# Patient Record
Sex: MALE | Race: WHITE | Employment: UNEMPLOYED | URBAN - METROPOLITAN AREA
[De-identification: names, ages, dates, MRNs, and addresses within clinical notes are randomized per-mention and may not be internally consistent; named-entity substitution may affect disease eponyms.]

---

## 2017-03-07 ENCOUNTER — OFFICE VISIT (OUTPATIENT)
Dept: PRIMARY CARE CLINIC | Age: 52
End: 2017-03-07

## 2017-03-07 VITALS
DIASTOLIC BLOOD PRESSURE: 95 MMHG | TEMPERATURE: 97.7 F | WEIGHT: 285 LBS | SYSTOLIC BLOOD PRESSURE: 145 MMHG | BODY MASS INDEX: 42.21 KG/M2 | OXYGEN SATURATION: 94 % | HEIGHT: 69 IN | HEART RATE: 87 BPM

## 2017-03-07 DIAGNOSIS — Z12.11 SCREEN FOR COLON CANCER: ICD-10-CM

## 2017-03-07 DIAGNOSIS — E78.49 OTHER HYPERLIPIDEMIA: ICD-10-CM

## 2017-03-07 DIAGNOSIS — R73.9 HYPERGLYCEMIA: ICD-10-CM

## 2017-03-07 DIAGNOSIS — R52 PAIN DISORDER: ICD-10-CM

## 2017-03-07 DIAGNOSIS — E55.9 VITAMIN D DEFICIENCY: ICD-10-CM

## 2017-03-07 DIAGNOSIS — F17.200 SMOKER: ICD-10-CM

## 2017-03-07 DIAGNOSIS — E66.01 MORBID OBESITY DUE TO EXCESS CALORIES (HCC): ICD-10-CM

## 2017-03-07 DIAGNOSIS — R60.0 LOCALIZED EDEMA: Primary | ICD-10-CM

## 2017-03-07 LAB
A/G RATIO: 1.7 (ref 1.1–2.2)
ALBUMIN SERPL-MCNC: 4.7 G/DL (ref 3.4–5)
ALP BLD-CCNC: 88 U/L (ref 40–129)
ALT SERPL-CCNC: 51 U/L (ref 10–40)
AMPHETAMINE SCREEN, URINE: NORMAL
ANION GAP SERPL CALCULATED.3IONS-SCNC: 15 MMOL/L (ref 3–16)
AST SERPL-CCNC: 28 U/L (ref 15–37)
BARBITURATE SCREEN URINE: NORMAL
BASOPHILS ABSOLUTE: 0 K/UL (ref 0–0.2)
BASOPHILS RELATIVE PERCENT: 0.2 %
BENZODIAZEPINE SCREEN, URINE: NORMAL
BILIRUB SERPL-MCNC: 0.3 MG/DL (ref 0–1)
BILIRUBIN URINE: NEGATIVE
BLOOD, URINE: ABNORMAL
BUN BLDV-MCNC: 14 MG/DL (ref 7–20)
CALCIUM SERPL-MCNC: 9.8 MG/DL (ref 8.3–10.6)
CANNABINOID SCREEN URINE: NORMAL
CHLORIDE BLD-SCNC: 100 MMOL/L (ref 99–110)
CHOLESTEROL, TOTAL: 175 MG/DL (ref 0–199)
CLARITY: CLEAR
CO2: 25 MMOL/L (ref 21–32)
COCAINE METABOLITE SCREEN URINE: NORMAL
COLOR: ABNORMAL
COMMENT UA: NORMAL
CREAT SERPL-MCNC: 0.8 MG/DL (ref 0.9–1.3)
EOSINOPHILS ABSOLUTE: 0.1 K/UL (ref 0–0.6)
EOSINOPHILS RELATIVE PERCENT: 1.2 %
EPITHELIAL CELLS, UA: 0 /HPF (ref 0–5)
GFR AFRICAN AMERICAN: >60
GFR NON-AFRICAN AMERICAN: >60
GLOBULIN: 2.8 G/DL
GLUCOSE BLD-MCNC: 90 MG/DL (ref 70–99)
GLUCOSE URINE: NEGATIVE MG/DL
HCT VFR BLD CALC: 47.9 % (ref 40.5–52.5)
HDLC SERPL-MCNC: 47 MG/DL (ref 40–60)
HEMOGLOBIN: 15.8 G/DL (ref 13.5–17.5)
HYALINE CASTS: 0 /HPF (ref 0–8)
KETONES, URINE: NEGATIVE MG/DL
LDL CHOLESTEROL CALCULATED: 100 MG/DL
LEUKOCYTE ESTERASE, URINE: NEGATIVE
LYMPHOCYTES ABSOLUTE: 3.1 K/UL (ref 1–5.1)
LYMPHOCYTES RELATIVE PERCENT: 32.2 %
Lab: NORMAL
MCH RBC QN AUTO: 31.7 PG (ref 26–34)
MCHC RBC AUTO-ENTMCNC: 32.9 G/DL (ref 31–36)
MCV RBC AUTO: 96.3 FL (ref 80–100)
METHADONE SCREEN, URINE: NORMAL
MICROSCOPIC EXAMINATION: YES
MONOCYTES ABSOLUTE: 0.9 K/UL (ref 0–1.3)
MONOCYTES RELATIVE PERCENT: 9.7 %
NEUTROPHILS ABSOLUTE: 5.4 K/UL (ref 1.7–7.7)
NEUTROPHILS RELATIVE PERCENT: 56.7 %
NITRITE, URINE: NEGATIVE
OPIATE SCREEN URINE: NORMAL
OXYCODONE URINE: NORMAL
PDW BLD-RTO: 13.5 % (ref 12.4–15.4)
PH UA: 6.5
PH UA: 6.5
PHENCYCLIDINE SCREEN URINE: NORMAL
PLATELET # BLD: 258 K/UL (ref 135–450)
PMV BLD AUTO: 8.3 FL (ref 5–10.5)
POTASSIUM SERPL-SCNC: 4.5 MMOL/L (ref 3.5–5.1)
PRO-BNP: 41 PG/ML (ref 0–124)
PROPOXYPHENE SCREEN: NORMAL
PROTEIN UA: NEGATIVE MG/DL
RBC # BLD: 4.97 M/UL (ref 4.2–5.9)
RBC UA: 2 /HPF (ref 0–4)
SODIUM BLD-SCNC: 140 MMOL/L (ref 136–145)
SPECIFIC GRAVITY UA: 1.03
TOTAL PROTEIN: 7.5 G/DL (ref 6.4–8.2)
TRIGL SERPL-MCNC: 138 MG/DL (ref 0–150)
TSH SERPL DL<=0.05 MIU/L-ACNC: 1.4 UIU/ML (ref 0.27–4.2)
URINE TYPE: ABNORMAL
UROBILINOGEN, URINE: 0.2 E.U./DL
VITAMIN D 25-HYDROXY: 20.8 NG/ML
VLDLC SERPL CALC-MCNC: 28 MG/DL
WBC # BLD: 9.6 K/UL (ref 4–11)
WBC UA: 1 /HPF (ref 0–5)

## 2017-03-07 PROCEDURE — 99204 OFFICE O/P NEW MOD 45 MIN: CPT | Performed by: INTERNAL MEDICINE

## 2017-03-07 ASSESSMENT — ENCOUNTER SYMPTOMS
DIARRHEA: 0
WHEEZING: 0
CHEST TIGHTNESS: 0
GASTROINTESTINAL NEGATIVE: 1
COUGH: 0
SHORTNESS OF BREATH: 1
BACK PAIN: 1
CONSTIPATION: 0
BLOOD IN STOOL: 0

## 2017-03-07 ASSESSMENT — PATIENT HEALTH QUESTIONNAIRE - PHQ9
SUM OF ALL RESPONSES TO PHQ QUESTIONS 1-9: 0
2. FEELING DOWN, DEPRESSED OR HOPELESS: 0
1. LITTLE INTEREST OR PLEASURE IN DOING THINGS: 0
SUM OF ALL RESPONSES TO PHQ9 QUESTIONS 1 & 2: 0

## 2017-03-08 LAB
ESTIMATED AVERAGE GLUCOSE: 128.4 MG/DL
HBA1C MFR BLD: 6.1 %

## 2017-03-08 RX ORDER — ERGOCALCIFEROL (VITAMIN D2) 1250 MCG
50000 CAPSULE ORAL WEEKLY
Qty: 4 CAPSULE | Refills: 3 | Status: SHIPPED | OUTPATIENT
Start: 2017-03-08 | End: 2018-10-31 | Stop reason: ALTCHOICE

## 2017-03-29 ENCOUNTER — OFFICE VISIT (OUTPATIENT)
Dept: PRIMARY CARE CLINIC | Age: 52
End: 2017-03-29

## 2017-03-29 VITALS
HEART RATE: 103 BPM | WEIGHT: 274 LBS | OXYGEN SATURATION: 96 % | SYSTOLIC BLOOD PRESSURE: 135 MMHG | BODY MASS INDEX: 40.46 KG/M2 | DIASTOLIC BLOOD PRESSURE: 85 MMHG

## 2017-03-29 DIAGNOSIS — R60.0 LOCALIZED EDEMA: Primary | ICD-10-CM

## 2017-03-29 DIAGNOSIS — F17.200 SMOKER: ICD-10-CM

## 2017-03-29 DIAGNOSIS — E55.9 VITAMIN D DEFICIENCY: ICD-10-CM

## 2017-03-29 DIAGNOSIS — R06.02 SOB (SHORTNESS OF BREATH): ICD-10-CM

## 2017-03-29 PROCEDURE — 99214 OFFICE O/P EST MOD 30 MIN: CPT | Performed by: INTERNAL MEDICINE

## 2017-03-29 RX ORDER — NICOTINE 21 MG/24HR
1 PATCH, TRANSDERMAL 24 HOURS TRANSDERMAL EVERY 24 HOURS
Qty: 30 PATCH | Refills: 3 | Status: SHIPPED | OUTPATIENT
Start: 2017-03-29 | End: 2018-10-17

## 2017-03-29 RX ORDER — FUROSEMIDE 20 MG/1
40 TABLET ORAL 2 TIMES DAILY
Qty: 60 TABLET | Refills: 3 | Status: SHIPPED | OUTPATIENT
Start: 2017-03-29 | End: 2018-08-08 | Stop reason: SDUPTHER

## 2017-03-29 RX ORDER — ERGOCALCIFEROL (VITAMIN D2) 1250 MCG
50000 CAPSULE ORAL WEEKLY
Qty: 4 CAPSULE | Refills: 3 | Status: SHIPPED | OUTPATIENT
Start: 2017-03-29 | End: 2017-05-09 | Stop reason: SDUPTHER

## 2017-03-29 ASSESSMENT — ENCOUNTER SYMPTOMS
DIARRHEA: 0
COUGH: 0
CONSTIPATION: 0
CHEST TIGHTNESS: 0
BACK PAIN: 1
GASTROINTESTINAL NEGATIVE: 1
WHEEZING: 0
BLOOD IN STOOL: 0
SHORTNESS OF BREATH: 1

## 2017-05-03 ENCOUNTER — HOSPITAL ENCOUNTER (OUTPATIENT)
Dept: NON INVASIVE DIAGNOSTICS | Age: 52
Discharge: HOME | End: 2017-05-04
Admitting: INTERNAL MEDICINE

## 2017-05-03 ENCOUNTER — HOSPITAL ENCOUNTER (OUTPATIENT)
Dept: PULMONOLOGY | Age: 52
Discharge: HOME | End: 2017-05-03
Attending: INTERNAL MEDICINE | Admitting: INTERNAL MEDICINE

## 2017-05-03 DIAGNOSIS — F17.200 SMOKER: ICD-10-CM

## 2017-05-03 DIAGNOSIS — R06.02 SOB (SHORTNESS OF BREATH): ICD-10-CM

## 2017-05-03 DIAGNOSIS — R06.02 SHORTNESS OF BREATH: ICD-10-CM

## 2017-05-03 LAB
DLCO: NORMAL ML/MMHG SEC
FEV1/FVC: NORMAL %
FEV1: NORMAL LITERS
FVC: NORMAL LITERS
LV EF: 60 %
LVEF MODALITY: NORMAL
TLC: NORMAL LITERS

## 2017-05-03 PROCEDURE — 94060 EVALUATION OF WHEEZING: CPT | Performed by: INTERNAL MEDICINE

## 2017-05-03 PROCEDURE — 94727 GAS DIL/WSHOT DETER LNG VOL: CPT | Performed by: INTERNAL MEDICINE

## 2017-05-03 PROCEDURE — 94729 DIFFUSING CAPACITY: CPT | Performed by: INTERNAL MEDICINE

## 2017-05-03 RX ORDER — ALBUTEROL SULFATE 90 UG/1
4 AEROSOL, METERED RESPIRATORY (INHALATION) ONCE
Status: COMPLETED | OUTPATIENT
Start: 2017-05-03 | End: 2017-05-03

## 2017-05-03 RX ADMIN — ALBUTEROL SULFATE 4 PUFF: 90 AEROSOL, METERED RESPIRATORY (INHALATION) at 14:15

## 2017-05-09 ENCOUNTER — OFFICE VISIT (OUTPATIENT)
Dept: CARDIOLOGY CLINIC | Age: 52
End: 2017-05-09

## 2017-05-09 VITALS
BODY MASS INDEX: 40.14 KG/M2 | OXYGEN SATURATION: 96 % | HEART RATE: 98 BPM | SYSTOLIC BLOOD PRESSURE: 128 MMHG | HEIGHT: 69 IN | DIASTOLIC BLOOD PRESSURE: 74 MMHG | WEIGHT: 271 LBS

## 2017-05-09 DIAGNOSIS — R60.0 LOCALIZED EDEMA: ICD-10-CM

## 2017-05-09 DIAGNOSIS — R06.09 DOE (DYSPNEA ON EXERTION): Primary | ICD-10-CM

## 2017-05-09 DIAGNOSIS — J43.9 PULMONARY EMPHYSEMA, UNSPECIFIED EMPHYSEMA TYPE (HCC): ICD-10-CM

## 2017-05-09 DIAGNOSIS — F17.218 CIGARETTE NICOTINE DEPENDENCE WITH OTHER NICOTINE-INDUCED DISORDER: ICD-10-CM

## 2017-05-09 PROCEDURE — 99204 OFFICE O/P NEW MOD 45 MIN: CPT | Performed by: INTERNAL MEDICINE

## 2017-05-09 PROCEDURE — 93000 ELECTROCARDIOGRAM COMPLETE: CPT | Performed by: INTERNAL MEDICINE

## 2018-08-08 ENCOUNTER — OFFICE VISIT (OUTPATIENT)
Dept: INTERNAL MEDICINE CLINIC | Age: 53
End: 2018-08-08

## 2018-08-08 VITALS
WEIGHT: 314 LBS | HEART RATE: 99 BPM | DIASTOLIC BLOOD PRESSURE: 85 MMHG | BODY MASS INDEX: 46.51 KG/M2 | RESPIRATION RATE: 16 BRPM | SYSTOLIC BLOOD PRESSURE: 138 MMHG | HEIGHT: 69 IN | OXYGEN SATURATION: 96 %

## 2018-08-08 DIAGNOSIS — Z76.89 ENCOUNTER TO ESTABLISH CARE: ICD-10-CM

## 2018-08-08 DIAGNOSIS — L03.115 CELLULITIS OF RIGHT LOWER LEG: Primary | ICD-10-CM

## 2018-08-08 DIAGNOSIS — R60.0 LOCALIZED EDEMA: ICD-10-CM

## 2018-08-08 PROCEDURE — 4004F PT TOBACCO SCREEN RCVD TLK: CPT | Performed by: NURSE PRACTITIONER

## 2018-08-08 PROCEDURE — 99204 OFFICE O/P NEW MOD 45 MIN: CPT | Performed by: NURSE PRACTITIONER

## 2018-08-08 PROCEDURE — G8427 DOCREV CUR MEDS BY ELIG CLIN: HCPCS | Performed by: NURSE PRACTITIONER

## 2018-08-08 PROCEDURE — G8417 CALC BMI ABV UP PARAM F/U: HCPCS | Performed by: NURSE PRACTITIONER

## 2018-08-08 PROCEDURE — 3017F COLORECTAL CA SCREEN DOC REV: CPT | Performed by: NURSE PRACTITIONER

## 2018-08-08 RX ORDER — FUROSEMIDE 40 MG/1
40 TABLET ORAL 2 TIMES DAILY
Qty: 60 TABLET | Refills: 1 | Status: SHIPPED | OUTPATIENT
Start: 2018-08-08 | End: 2018-11-07 | Stop reason: SDUPTHER

## 2018-08-08 RX ORDER — SULFAMETHOXAZOLE AND TRIMETHOPRIM 800; 160 MG/1; MG/1
1 TABLET ORAL 2 TIMES DAILY
Qty: 14 TABLET | Refills: 0 | Status: SHIPPED | OUTPATIENT
Start: 2018-08-08 | End: 2018-08-15

## 2018-08-08 ASSESSMENT — PATIENT HEALTH QUESTIONNAIRE - PHQ9
2. FEELING DOWN, DEPRESSED OR HOPELESS: 0
SUM OF ALL RESPONSES TO PHQ QUESTIONS 1-9: 0
SUM OF ALL RESPONSES TO PHQ QUESTIONS 1-9: 0
SUM OF ALL RESPONSES TO PHQ9 QUESTIONS 1 & 2: 0
1. LITTLE INTEREST OR PLEASURE IN DOING THINGS: 0

## 2018-08-08 NOTE — PATIENT INSTRUCTIONS
scrapes, or other injuries to your skin. Cellulitis most often occurs where there is a break in the skin. · If you get a scrape, cut, mild burn, or bite, wash the wound with clean water as soon as you can to help avoid infection. Don't use hydrogen peroxide or alcohol, which can slow healing. · If you have swelling in your legs (edema), support stockings and good skin care may help prevent leg sores and cellulitis. · Take care of your feet, especially if you have diabetes or other conditions that increase the risk of infection. Wear shoes and socks. Do not go barefoot. If you have athlete's foot or other skin problems on your feet, talk to your doctor about how to treat them. When should you call for help? Call your doctor now or seek immediate medical care if:    · You have signs that your infection is getting worse, such as:  ¨ Increased pain, swelling, warmth, or redness. ¨ Red streaks leading from the area. ¨ Pus draining from the area. ¨ A fever.     · You get a rash.    Watch closely for changes in your health, and be sure to contact your doctor if:    · You do not get better as expected. Where can you learn more? Go to https://[email or web address]. org and sign in to your [employer] account. Enter F353 in the [facility] box to learn more about \"Cellulitis: Care Instructions. \"     If you do not have an account, please click on the \"Sign Up Now\" link. Current as of: May 10, 2017  Content Version: 11.7  © 6768-9564 [employer]. Care instructions adapted under license by [facility] ([facility]). If you have questions about a medical condition or this instruction, always ask your healthcare professional. [place] [address] any warranty or liability for your use of this information.

## 2018-08-08 NOTE — PROGRESS NOTES
8/14/18    Chief Complaint   Patient presents with   [facility] ED Follow-up     had infection leg-still draining    Establish Care     does not have a PCP       HPI:   Donna Collier is a 47 y.o. male is here today for follow from ED and to establish care with new PCP. Patient was seen in ED on 7/25/18 for open wound on right leg. Recommended for patient to be admitted for IV antibiotics but patient refused so was placed on Clindamycin 3 times per day, completed on Saturday, however no improvement. Patient also has severe lower leg edema bilaterally, reports having  problems with lower leg edema for years but nothing ever done. Patient reports feet and legs are so swollen, sometimes cannot get on his shoes. Patient Active Problem List   Diagnosis    Bilateral lower extremity edema    Shortness of breath    [facility]ch[facility] disease) ([facility])    Leg ulcer, right, limited to breakdown of skin ([address]    Leg swelling    Lymphedema of both lower extremities    Tobacco abuse       Past Medical History:   Diagnosis Date    COPD (chronic obstructive pulmonary disease) ([facility]        Family History   Problem Relation Age of Onset    Other Mother         liver from drinking    Heart Disease Father        No Known Allergies    Current Outpatient Prescriptions   Medication Sig Dispense Refill    furosemide (LASIX) 40 MG tablet Take 1 tablet by mouth 2 times daily 60 tablet 1    sulfamethoxazole-trimethoprim (BACTRIM DS) 800-160 MG per tablet Take 1 tablet by mouth 2 times daily for 7 days 14 tablet 0    ciprofloxacin (CIPRO) 500 MG tablet Take 1 tablet by mouth 2 times daily for 10 days 20 tablet 0    collagenase (SANTYL) 250 UNIT/GM ointment Apply topically to wound bed (nickel-thickness). Cover with gauze moistened in normal saline, followed by dry gauze dressing and an ACE wrap.   Wound measures 7.8 cm x 27 cm x 0.1 cm 30 g 1    nicotine (NICODERM CQ) 21 MG/24HR Place 1 patch onto the skin every 24

## 2018-08-10 ENCOUNTER — HOSPITAL ENCOUNTER (OUTPATIENT)
Dept: WOUND CARE | Age: 53
Discharge: HOME | End: 2018-08-10
Attending: NURSE PRACTITIONER | Admitting: NURSE PRACTITIONER

## 2018-08-10 VITALS
TEMPERATURE: 98.3 F | HEART RATE: 105 BPM | WEIGHT: 315 LBS | BODY MASS INDEX: 46.65 KG/M2 | HEIGHT: 69 IN | DIASTOLIC BLOOD PRESSURE: 91 MMHG | SYSTOLIC BLOOD PRESSURE: 150 MMHG | RESPIRATION RATE: 16 BRPM

## 2018-08-10 DIAGNOSIS — L97.911 LEG ULCER, RIGHT, LIMITED TO BREAKDOWN OF SKIN (HCC): ICD-10-CM

## 2018-08-10 DIAGNOSIS — M79.89 LEG SWELLING: ICD-10-CM

## 2018-08-10 DIAGNOSIS — I89.0 LYMPHEDEMA OF BOTH LOWER EXTREMITIES: ICD-10-CM

## 2018-08-10 PROCEDURE — 97598 DBRDMT OPN WND ADDL 20CM/<: CPT | Performed by: NURSE PRACTITIONER

## 2018-08-10 PROCEDURE — 97597 DBRDMT OPN WND 1ST 20 CM/<: CPT | Performed by: NURSE PRACTITIONER

## 2018-08-10 PROCEDURE — 99213 OFFICE O/P EST LOW 20 MIN: CPT | Performed by: NURSE PRACTITIONER

## 2018-08-10 RX ORDER — LIDOCAINE HYDROCHLORIDE 40 MG/ML
SOLUTION TOPICAL PRN
Status: DISCONTINUED | OUTPATIENT
Start: 2018-08-10 | End: 2018-08-11 | Stop reason: HOSPADM

## 2018-08-10 ASSESSMENT — PAIN SCALES - GENERAL: PAINLEVEL_OUTOF10: 0

## 2018-08-10 NOTE — PLAN OF CARE
Problem: Wound:  Goal: Will show signs of wound healing; wound closure and no evidence of infection  Will show signs of wound healing; wound closure and no evidence of infection  Outcome: Ongoing      Problem: Venous:  Goal: Signs of wound healing will improve  Signs of wound healing will improve  Outcome: Ongoing      Problem: Smoking cessation:  Goal: Ability to formulate a plan to maintain a tobacco-free life will be supported  Ability to formulate a plan to maintain a tobacco-free life will be supported  Outcome: Ongoing      Problem: Compression therapy:  Goal: Will be free from complications associated with compression therapy  Will be free from complications associated with compression therapy  Outcome: Ongoing      Problem: Weight control:  Goal: Ability to maintain an optimal weight for height and age will be supported  Ability to maintain an optimal weight for height and age will be supported  Outcome: Ongoing      Problem: Falls - Risk of:  Goal: Will remain free from falls  Will remain free from falls  Outcome: Ongoing

## 2018-08-12 PROBLEM — I89.0 LYMPHEDEMA OF BOTH LOWER EXTREMITIES: Status: ACTIVE | Noted: 2018-08-12

## 2018-08-13 LAB
GRAM STAIN RESULT: ABNORMAL
ORGANISM: ABNORMAL
ORGANISM: ABNORMAL
WOUND/ABSCESS: ABNORMAL

## 2018-08-13 RX ORDER — CIPROFLOXACIN 500 MG/1
500 TABLET, FILM COATED ORAL 2 TIMES DAILY
Qty: 20 TABLET | Refills: 0 | Status: SHIPPED | OUTPATIENT
Start: 2018-08-13 | End: 2018-08-23

## 2018-08-14 PROBLEM — Z72.0 TOBACCO ABUSE: Status: ACTIVE | Noted: 2018-08-14

## 2018-08-17 ENCOUNTER — HOSPITAL ENCOUNTER (OUTPATIENT)
Dept: WOUND CARE | Age: 53
Discharge: HOME | End: 2018-08-17
Attending: NURSE PRACTITIONER | Admitting: NURSE PRACTITIONER

## 2018-08-17 VITALS
RESPIRATION RATE: 18 BRPM | DIASTOLIC BLOOD PRESSURE: 89 MMHG | HEART RATE: 110 BPM | TEMPERATURE: 98.2 F | SYSTOLIC BLOOD PRESSURE: 174 MMHG

## 2018-08-17 DIAGNOSIS — I89.0 LYMPHEDEMA OF BOTH LOWER EXTREMITIES: ICD-10-CM

## 2018-08-17 DIAGNOSIS — R60.0 BILATERAL LOWER EXTREMITY EDEMA: ICD-10-CM

## 2018-08-17 DIAGNOSIS — L97.911 LEG ULCER, RIGHT, LIMITED TO BREAKDOWN OF SKIN (HCC): Primary | ICD-10-CM

## 2018-08-17 PROCEDURE — 97598 DBRDMT OPN WND ADDL 20CM/<: CPT | Performed by: NURSE PRACTITIONER

## 2018-08-17 PROCEDURE — 97597 DBRDMT OPN WND 1ST 20 CM/<: CPT | Performed by: NURSE PRACTITIONER

## 2018-08-17 RX ORDER — LIDOCAINE HYDROCHLORIDE 40 MG/ML
SOLUTION TOPICAL PRN
Status: DISCONTINUED | OUTPATIENT
Start: 2018-08-17 | End: 2018-08-18 | Stop reason: HOSPADM

## 2018-08-17 NOTE — PROGRESS NOTES
for 10 days 20 tablet 0    collagenase (SANTYL) 250 UNIT/GM ointment Apply topically to wound bed (nickel-thickness). Cover with gauze moistened in normal saline, followed by dry gauze dressing and an ACE wrap. Wound measures 7.8 cm x 27 cm x 0.1 cm 30 g 1    furosemide (LASIX) 40 MG tablet Take 1 tablet by mouth 2 times daily 60 tablet 1    nicotine (NICODERM CQ) 21 MG/24HR Place 1 patch onto the skin every 24 hours 30 patch 3    ergocalciferol (DRISDOL) 74278 UNITS capsule Take 1 capsule by mouth once a week 4 capsule 3     No current facility-administered medications on file prior to encounter. REVIEW OF SYSTEMS    Pertinent items are noted in HPI.     Objective:      BP (!) 174/89   Pulse 110   Temp 98.2 °F (36.8 °C) (Oral)   Resp 18     Wt Readings from Last 3 Encounters:   08/10/18 (!) 321 lb 3.4 oz (145.7 kg)   08/08/18 (!) 314 lb (142.4 kg)   07/25/18 (!) 317 lb 7.4 oz (144 kg)       PHYSICAL EXAM    General Appearance: alert and oriented to person, place and time, well developed and well- nourished, in no acute distress  Skin: warm and dry; right lower leg with venous ulcer  Head: normocephalic and atraumatic  Neck: supple and non-tender without mass  Pulmonary/Chest: clear to auscultation bilaterally- no wheezes, rales or rhonchi, normal air movement, no respiratory distress  Cardiovascular: distal pulses intact per doppler (patient's foot is too swollen to find palpable pulses today)  Extremities: no cyanosis or clubbing; +4 pitting edema; patient has lymphedema in bilateral LE's  Musculoskeletal: normal range of motion, no joint swelling, deformity or tenderness  Neurologic: reflexes normal and symmetric, no cranial nerve deficit, gait, coordination and speech normal      Assessment:      Patient Active Problem List   Diagnosis Code    Bilateral lower extremity edema R60.0    Shortness of breath R06.02    COPD (chronic obstructive pulmonary disease) [facility] J44.9    Leg ulcer, right, limited to breakdown of skin ([facility] L97.911    Leg swelling M79.89    Lymphedema of both lower extremities I89.0    Tobacco abuse Z72.0          Non-Excisional Debridement Procedure Note    Performed by: MARY Rodriguez CNP    Consent obtained:  Yes    Time out taken:  Yes     Pain Control: Anesthetic: 4% Lidocaine Liquid Topical (10 ml)     Debridement: Non-excisional Debridement    Devitalized Tissue Debrided: slough      Instruments Used:  curette     Pre Debridement Measurements:  Are located in the [place]  Documentation Flow Sheet  Wound/Ulcer #: 1     Post  Debridement Measurements:  Wound 08/10/18 Leg Right; Anterior; Lower; Lateral;Posterior #1 (wound appeared approximately 7/1/2018) (Active)   Wound Image    8/10/2018 11:24 AM   Wound Type Wound 8/17/2018 10:16 AM   Wound Venous 8/17/2018 10:16 AM   Dressing Status Intact; Old drainage 8/17/2018 10:16 AM   Dressing Changed Changed/New 8/17/2018 11:48 AM   Dressing/Treatment Ace Wrap;Alginate;Dry dressing 8/17/2018 11:48 AM   Wound Cleansed Rinsed/Irrigated with saline 8/10/2018 11:24 AM   Wound Length (cm) 7.3 cm 8/17/2018 10:33 AM   Wound Width (cm) 25.1 cm 8/17/2018 10:33 AM   Wound Depth (cm)  0.1 8/17/2018 10:33 AM   Calculated Wound Size (cm^2) (l*w) 183.23 cm^2 8/17/2018 10:33 AM   Change in Wound Size % (l*w) 13 8/17/2018 10:33 AM   Wound Assessment Granulation tissue;Slough 8/17/2018 10:16 AM   Drainage Amount Moderate 8/17/2018 10:16 AM   Drainage Description Green 8/17/2018 10:16 AM   Odor None 8/17/2018 10:16 AM   Margins Attached edges 8/17/2018 10:16 AM   Azucena-wound Assessment Dry;Pink 8/17/2018 10:16 AM   Non-staged Wound Description Full thickness 8/17/2018 10:16 AM   [place]%Wound Bed 10 8/17/2018 10:16 AM   Yellow%Wound Bed 90 8/17/2018 10:16 AM   Op First Treatment Date 08/10/18 8/17/2018 10:16 AM   Number of days: 7       Percent of Wound/Ulcer Debrided:  100%    Total Surface Area Debrided:  183.23 sq cm    Diabetic/Pressure/Non Pressure Ulcers only:  Ulcer: Non-Pressure ulcer, limited to breakdown of skin    Bleeding:  Minimal    Hemostasis Achieved:  by pressure    Procedural Pain:  8  / 10     Post Procedural Pain:  2 / 10     Response to treatment:  With complaints of pain. PATIENT EDUCATION focused on proper application of ACE wrap. Patient instructed to start at the ankle, go down to just above his toes and go up leg to just below knee. Avoid Velcro touching the skin. Patient verbalized understanding. He was instructed to wrap both legs. Plan:     Treatment Note please see attached Discharge Instructions    In my professional opinion this patient would benefit from HBO Therapy: No    Written patient dismissal instructions given to patient and signed by patient or POA. Discharge Instructions         Discharge Instructions        Wound Clinic Physician Orders and Discharge Instructions  [place] [address] [facility] [address], [place] [address]  Telephone: [phone]     NAME:  Francesca Beaver OF BIRTH:  1965  MEDICAL RECORD NUMBER:  [id number]  DATE:  8/17/2018     Wound Cleansing:   Do not scrub or use excessive force. Cleanse wound prior to applying a clean dressing with:  [] Normal Saline            [x] Keep Wound Dry in Shower    [] Wound Cleanser   [] Cleanse wound with Mild Soap & Water  [] May Shower at Discharge   [] Other:        Topical Treatments:  Do not apply lotions, creams, or ointments to wound bed unless directed. [] Apply moisturizing lotion to skin surrounding the wound prior to dressing change.  [] Apply antifungal ointment to skin surrounding the wound prior to dressing change.  [] Apply thin film of moisture barrier ointment to skin immediately around wound.   [] Other:                  Dressings:                  Wound Location: RIGHT LOWER LEG WOUNDS              [x] Apply Primary Dressing: ____________     [] Patient unable to sign Discharge Instructions given to ECF/Transportation/POA        [x]  After Visit Summary  []  Comprehensive Discharge Instruction             Electronically signed by MARY Gill CNP on 8/17/2018 at 12:39 PM

## 2018-08-24 ENCOUNTER — HOSPITAL ENCOUNTER (OUTPATIENT)
Dept: WOUND CARE | Age: 53
Discharge: HOME | End: 2018-08-24
Attending: NURSE PRACTITIONER | Admitting: NURSE PRACTITIONER

## 2018-08-24 VITALS
RESPIRATION RATE: 20 BRPM | HEART RATE: 112 BPM | TEMPERATURE: 97.8 F | SYSTOLIC BLOOD PRESSURE: 174 MMHG | DIASTOLIC BLOOD PRESSURE: 94 MMHG

## 2018-08-24 DIAGNOSIS — I89.0 LYMPHEDEMA OF BOTH LOWER EXTREMITIES: ICD-10-CM

## 2018-08-24 DIAGNOSIS — M79.89 LEG SWELLING: ICD-10-CM

## 2018-08-24 DIAGNOSIS — L97.911 LEG ULCER, RIGHT, LIMITED TO BREAKDOWN OF SKIN (HCC): Primary | ICD-10-CM

## 2018-08-24 PROCEDURE — 97597 DBRDMT OPN WND 1ST 20 CM/<: CPT | Performed by: NURSE PRACTITIONER

## 2018-08-24 PROCEDURE — 97598 DBRDMT OPN WND ADDL 20CM/<: CPT | Performed by: NURSE PRACTITIONER

## 2018-08-24 RX ORDER — CIPROFLOXACIN 500 MG/1
500 TABLET, FILM COATED ORAL 2 TIMES DAILY
COMMUNITY
Start: 2018-08-13 | End: 2018-08-25

## 2018-08-24 RX ORDER — LIDOCAINE HYDROCHLORIDE 40 MG/ML
SOLUTION TOPICAL PRN
Status: DISCONTINUED | OUTPATIENT
Start: 2018-08-24 | End: 2018-08-25 | Stop reason: HOSPADM

## 2018-08-24 NOTE — PROGRESS NOTES
[facility]   Progress Note and Procedure Note      Adam Chand  MEDICAL RECORD NUMBER:  [id number]  AGE: 47 y.o. GENDER: male  : 1965  EPISODE DATE:  2018    Subjective:     Chief Complaint   Patient presents with    Wound Check     follow up for wounds on right lower leg         HISTORY of PRESENT ILLNESS HPI    Hamlet Mcgrath is a 47 y. o. male who presents today for wound/ulcer evaluation. History of Wound Context: venous.  The patient presented to the ED on 18 for RLE cellulitis.  At that time, hospitalization was recommended, but the patient refused. Jose Alejandro Sales was treated with IV clindamycin in the ED and sent home with PO clindamycin. His wound culture was + for pseudomonas and we treated him with Cipro.   Wound/Ulcer Pain Timing/Severity: none  Quality of pain: N/A  Severity:  0 / 10   Modifying Factors: Pain worsens with local pressure and an increase in the amount of swelling in his LE  Associated Signs/Symptoms: edema, drainage     Ulcer Identification:  Ulcer Type: venous  Contributing Factors: edema, venous stasis, decreased mobility and obesity     Wound: N/A        PAST MEDICAL HISTORY        Diagnosis Date    COPD (chronic obstructive pulmonary disease) (H[facility])        PAST SURGICAL HISTORY    Past Surgical History:   Procedure Laterality Date    EYE SURGERY Left     weak eye muscle       FAMILY HISTORY    Family History   Problem Relation Age of Onset    Other Mother         liver from drinking    Heart Disease Father        SOCIAL HISTORY    Social History   Substance Use Topics    Smoking status: Current Every Day Smoker     Packs/day: 1.00     Types: Cigarettes    Smokeless tobacco: Current User    Alcohol use 7.2 oz/week     12 Cans of beer per week      Comment: quit liquor about 6 months ago       ALLERGIES    No Known Allergies    MEDICATIONS    Current Outpatient Prescriptions on File Prior to Encounter   Medication Sig Dispense Refill    Wrap                  Avoid contact of tape with skin.     [x] Change dressing:       [x] Three Times A Week        Edema Control: ACE WRAPS TO BILATERAL LOWER LEGS  Apply:  [] Compression Stocking           []Right Leg         []Left Leg              [] Tubigrip          []Right Leg Double Layer          []Left Leg Double Layer                                                  []Right Leg Single Layer           []Left Leg Single Layer              [] SpandaGrip    []Right Leg         []Left Leg                                      []Low compression 5-10 mm/Hg                                                 []Medium compression 10-20 mm/Hg                                      []High compression  20-30 mm/Hg              every morning immediately when getting up should be applied to affected leg(s) from mid foot to knee making sure to cover the heel.  Remove every night before going to bed.              [] Elevate leg(s) above the level of the heart when sitting.                    [] Avoid prolonged standing in one place.              [] Elevate arm/hand above the level of the heart         []RightArm         []LeftArm                Compression:  Apply:  [] Multilayer Compression Wrap Applied in Clinic        []RightLeg          []Left Leg              [] Multi-layer compression.  Do not get leg(s) with wrap wet.  If wraps become too tight call the center or completely remove the wrap.                 [] Elevate leg(s) above the level of the heart when sitting.                    [] Avoid prolonged standing in one place.     Dietary:  [x] Diet as tolerated:        [] Calorie Diabetic Diet: [] No Added Salt:  [] Increase Protein:        [] Other:      Activity:  [x] Activity as tolerated:  [] Patient has no activity restrictions     [] Strict Bedrest:            [] Remain off Work:     [] May return to full duty work:                [] NGPNLB to work with restrictions:          Return Appointment:  [] Wound and dressing supply provider:   [x] ECF or Home Healthcare: [facility]  [] Nurse visit: Vanda Simon or NP scheduled for Nurse Visit:   [x] Return Appointment: With Hesham Velez  in  1 Week(s)  [] Ordered tests:      Nurse Case Manger:  Celia  Electronically signed by Rubén Krishnan RN on 8/24/2018 at 9:34 AM        [address] [place] [address] Information: Should you experience any significant changes in your wound(s) or have questions about your wound care, please contact the [address] [place] [address] [phone] [facility] - THURSDAY 8:30 am - 4:30 pm and Friday 8:30 am - 1:00 pm.  If you need help with your wound outside these hours and cannot wait until we are again available, contact your PCP or go to the hospital emergency room.      Nurse Signature:_______________________     Date: ___________ Time:  ____________        Discharge Nurse Signature        PLEASE NOTE: IF YOU ARE UNABLE TO OBTAIN WOUND SUPPLIES, CONTINUE TO USE THE SUPPLIES YOU HAVE AVAILABLE UNTIL YOU ARE ABLE TO [facility].  IT IS MOST IMPORTANT TO KEEP THE WOUND COVERED AT ALL TIMES.     Physician Signature:_______________________     Date: ___________ Time:  ____________                    [] Dr Alysa Brown    [] Dr Jessica Skelton CNP              [] Dr Rigoberto Mcburney  [] Dr Krystle Pavon   [] Dr Quinton العراقي             [] Dr Marlena Rich                [] Dr Luke Espinosa   [x] Rodger Nascimento NP            The information contained in the After Visit Summary has been reviewed with me, the patient and/or responsible adult, by my health care provider(s).  I had the opportunity to ask questions regarding this information.  I have elected to receive;        Patient Signature:_______________________     Date: ___________ Time:  ____________     [] Patient unable to sign Discharge Instructions given to ECF/Transportation/POA        [x]  After Visit Summary  []  Comprehensive Discharge Instruction             Electronically signed by MARY Mitchell CNP on 8/24/2018 at 1:19 PM

## 2018-08-29 ENCOUNTER — HOSPITAL ENCOUNTER (OUTPATIENT)
Dept: WOUND CARE | Age: 53
Discharge: HOME | End: 2018-08-29
Admitting: NURSE PRACTITIONER

## 2018-08-29 VITALS
DIASTOLIC BLOOD PRESSURE: 96 MMHG | RESPIRATION RATE: 18 BRPM | SYSTOLIC BLOOD PRESSURE: 146 MMHG | HEART RATE: 100 BPM | TEMPERATURE: 98.7 F

## 2018-08-29 DIAGNOSIS — E66.01 MORBID OBESITY (HCC): ICD-10-CM

## 2018-08-29 DIAGNOSIS — I87.2 VENOUS (PERIPHERAL) INSUFFICIENCY: ICD-10-CM

## 2018-08-29 RX ORDER — LIDOCAINE HYDROCHLORIDE 40 MG/ML
SOLUTION TOPICAL PRN
Status: DISCONTINUED | OUTPATIENT
Start: 2018-08-29 | End: 2018-08-30 | Stop reason: HOSPADM

## 2018-08-30 PROBLEM — I87.2 VENOUS (PERIPHERAL) INSUFFICIENCY: Status: ACTIVE | Noted: 2018-08-30

## 2018-08-30 PROBLEM — E66.01 MORBID OBESITY (HCC): Status: ACTIVE | Noted: 2018-08-30

## 2018-08-30 NOTE — PROGRESS NOTES
Mary Carmen Steele 37   Progress Note and Procedure Note      Lily Aguilar  MEDICAL RECORD NUMBER:  [id number]  AGE: 47 y.o. GENDER: male  : 1965  EPISODE DATE:  2018    Subjective:     Chief Complaint   Patient presents with    Wound Check     wound right lower leg         HISTORY of PRESENT ILLNESS HPI    Hamlet Mcgrath is a 47 y. o. male who presents today for wound/ulcer evaluation. History of Wound Context: venous.  The patient presented to the ED on 18 for RLE cellulitis.  At that time, hospitalization was recommended, but the patient refused. Lito Tejeda was treated with IV clindamycin in the ED and sent home with PO clindamycin. His wound culture was + for pseudomonas and he was treated  with Cipro.   Wound/Ulcer Pain Timing/Severity: none  Quality of pain: N/A  Severity:  0 / 10   Modifying Factors: Pain worsens with local pressure and an increase in the amount of swelling in his LE  Associated Signs/Symptoms: edema, drainage     Ulcer Identification:  Ulcer Type: venous  Contributing Factors: edema, venous stasis, decreased mobility and obesity     Wound: N/A            PAST MEDICAL HISTORY        Diagnosis Date    COPD (chronic obstructive pulmonary disease) (HCC)        PAST SURGICAL HISTORY    Past Surgical History:   Procedure Laterality Date    EYE SURGERY Left 1968    weak eye muscle       FAMILY HISTORY    Family History   Problem Relation Age of Onset    Other Mother         liver from drinking    Heart Disease Father        SOCIAL HISTORY    Social History   Substance Use Topics    Smoking status: Current Every Day Smoker     Packs/day: 1.00     Types: Cigarettes    Smokeless tobacco: Current User    Alcohol use 7.2 oz/week     12 Cans of beer per week      Comment: quit liquor about 6 months ago       ALLERGIES    No Known Allergies    MEDICATIONS    Current Outpatient Prescriptions on File Prior to Encounter   Medication Sig Dispense Refill    furosemide (chronic obstructive pulmonary disease) ([facility]) J44.9    Leg ulcer, right, limited to breakdown of skin ([facility]) L97.911    Leg swelling M79.89    Lymphedema of both lower extremities I89.0    Tobacco abuse Z72.0    Venous (peripheral) insufficiency I87.2    Morbid obesity ([facility]) E66.01        Procedure Note  Indications:  Based on my examination of this patient's wound(s)/ulcer(s) today, debridement is required to promote healing and evaluate the wound base. Performed by: Brice Felty, MD    Consent obtained:  Yes    Time out taken:  Yes    Pain Control: Anesthetic  Anesthetic: 4% Lidocaine Liquid Topical (10 ml)       Debridement:Non-excisional Debridement    Using curette and forceps the wound(s)/ulcer(s) was/were debrided down through and including the removal of       Devitalized Tissue Debrided:  fibrin, biofilm, slough and exudate    Pre Debridement Measurements:  Are located in the [place]  Documentation Flow Sheet    Wound/Ulcer #: 1    Post Debridement Measurements:  Wound/Ulcer Descriptions are Pre Debridement except measurements:    Wound 08/10/18 Leg Right; Anterior; Lower; Lateral;Posterior #1 (wound appeared approximately 7/1/2018) (Active)   Wound Image    8/10/2018 11:24 AM   Wound Type Wound 8/29/2018  8:26 AM   Wound Venous 8/29/2018  8:26 AM   Dressing Status Intact; Old drainage 8/29/2018  8:26 AM   Dressing Changed Changed/New 8/17/2018 11:48 AM   Dressing/Treatment Other (Comment) 8/24/2018 10:10 AM   Wound Cleansed Rinsed/Irrigated with saline 8/10/2018 11:24 AM   Wound Length (cm) 7.1 cm 8/29/2018  8:26 AM   Wound Width (cm) 21.8 cm 8/29/2018  8:26 AM   Wound Depth (cm)  0.2 8/29/2018  9:24 AM   Calculated Wound Size (cm^2) (l*w) 154.78 cm^2 8/29/2018  8:26 AM   Change in Wound Size % (l*w) 26.51 8/29/2018  8:26 AM   Wound Assessment Granulation tissue;Slough 8/29/2018  8:26 AM   Drainage Amount Moderate 8/29/2018  8:26 AM   Drainage Description Serosanguinous 8/29/2018  8:26 AM   Odor None 8/29/2018  8:26 AM   Margins Attached edges 8/29/2018  8:26 AM   Azucena-wound Assessment Dry 8/29/2018  8:26 AM   Non-staged Wound Description Full thickness 8/29/2018  8:26 AM   [place]%Wound Bed 30 8/29/2018  8:26 AM   Yellow%Wound Bed 70 8/29/2018  8:26 AM   Op First Treatment Date 08/10/18 8/24/2018  9:15 AM   Number of days: 19       Percent of Wound(s)/Ulcer(s) Debrided: 100%    Total Surface Area Debrided:  154.78 sq cm       Diabetic/Pressure/Non Pressure Ulcers only:  Ulcer: non pressure ulcer limited to breakdown of skin      Estimated Blood Loss:  Minimal    Hemostasis Achieved:  by pressure    Procedural Pain:  3  / 10     Post Procedural Pain:  0 / 10     Response to treatment:  Well tolerated by patient. Plan:     Treatment Note please see attached Discharge Instructions    Written patient dismissal instructions given to patient and signed by patient or POA. Discharge Instructions       Wound Clinic Physician Orders and Discharge Instructions  [place] [address]  Telephone: [phone]      FAX [phone]     NAME: Nic No  DATE OF BIRTH:  1965  MEDICAL RECORD NUMBER:  [id number]  DATE:  8/29/2018     Wound Cleansing:   Do not scrub or use excessive force. Cleanse wound prior to applying a clean dressing with:  [x] Normal Saline            [x] Keep Wound Dry in Shower    [] Wound Cleanser   [] Cleanse wound with Mild Soap & Water  [] May Shower at Discharge   [] Other:        Topical Treatments:  Do not apply lotions, creams, or ointments to wound bed unless directed. [] Apply moisturizing lotion to skin surrounding the wound prior to dressing change.  [] Apply antifungal ointment to skin surrounding the wound prior to dressing change.  [] Apply thin film of moisture barrier ointment to skin immediately around wound.   [] Other:       Dressings:                  Wound Location: RIGHT LOWER LEG WOUNDS              [x] Apply Primary Dressing:                                                 [x] ALGINATE WITH SILVER      [x] Cover and Secure with:                       [x] Extrasorb                          [x] Kerlix              [x] Ace Wrap                  Avoid contact of tape with skin.     [x] Change dressing:       [x] Three Times A Week        Edema Control: ACE WRAPS TO BILATERAL LOWER LEGS - 2 LAYERS TO EACH LEG  Apply:  [] Compression Stocking           []Right Leg         []Left Leg              [] Tubigrip          []Right Leg Double Layer          []Left Leg Double Layer                                                  []Right Leg Single Layer           []Left Leg Single Layer              [] SpandaGrip    []Right Leg         []Left Leg                                      []Low compression 5-10 mm/Hg                                                 []Medium compression 10-20 mm/Hg                                      []High compression  20-30 mm/Hg              every morning immediately when getting up should be applied to affected leg(s) from mid foot to knee making sure to cover the heel.  Remove every night before going to bed.              [x] Elevate leg(s) above the level of the heart when sitting.                    [x] Avoid prolonged standing in one place.                       Compression:  Apply:  [] Multilayer Compression Wrap Applied in Clinic        []RightLeg          []Left Leg              [] Multi-layer compression.  Do not get leg(s) with wrap wet.  If wraps become too tight call the center or completely remove the wrap.                 [] Elevate leg(s) above the level of the heart when sitting.                    [] Avoid prolonged standing in one place.     Dietary:  [x] Diet as tolerated:        [] Calorie Diabetic Diet: [] No Added Salt:  [] Increase Protein:        [] Other:      Activity:  [x] Activity as tolerated:  [] Patient has no activity restrictions     [] Strict Bedrest:            [] Remain off Work:     [] May return to full duty work: [id number] to work with restrictions:          Return Appointment:  [] Wound and dressing supply provider:   [x] MANJIT or Shane 113: Lisa Nina 87  [] Nurse visit:     [] Physician or NP scheduled for Nurse Visit:   [x] Return Appointment: With DR GARNER [facility] [place])  [] Ordered tests:      Nurse Case Manger: Bob Lomax    Electronically signed by Stephanie Palafox RN on 8/29/2018 at 9:21 [address] Information: Should you experience any significant changes in your wound(s) or have questions about your wound care, please [facility] [phone] [facility] - THURSDAY 8:30 am - 4:30 pm and Friday 8:30 am - 1:00 pm.  If you need help with your wound outside these hours and cannot wait until we are again available, contact your PCP or go to the hospital emergency room.      Nurse Signature:_______________________     Date: ___________ Time:  ____________        Discharge Nurse Signature        PLEASE NOTE: IF YOU ARE UNABLE TO OBTAIN WOUND SUPPLIES, CONTINUE TO USE THE SUPPLIES YOU HAVE AVAILABLE UNTIL YOU ARE ABLE TO [facility].  IT IS MOST IMPORTANT TO KEEP THE WOUND COVERED AT ALL TIMES.     Physician Signature:_______________________     Date: ___________ Time:  ____________                    [] Dr Grace Mathew    [] Dr Danielle Diaz   [] Yocasta Pat CNP              [] Dr Jojo Martinez  [] Dr Nicolas Kerr   [] Dr Karoline العراقي             [x] Dr Ming Garcia                [] Dr Mary Hummel   [] Rodger Prajapati NP            The information contained in the After Visit Summary has been reviewed with me, the patient and/or responsible adult, by my health care provider(s).  I had the opportunity to ask questions regarding this information.  I have elected to receive;        Patient Signature:_______________________     Date: ___________ Time:  ____________     [] Patient unable to sign Discharge Instructions given to ECF/Transportation/POA        [x]  After Visit Summary  []  Comprehensive Discharge Instruction        Electronically signed by Varun Harding MD on 8/30/2018 at 10:59 AM

## 2018-09-12 ENCOUNTER — HOSPITAL ENCOUNTER (OUTPATIENT)
Dept: WOUND CARE | Age: 53
Discharge: HOME | End: 2018-09-12
Attending: SURGERY | Admitting: SURGERY

## 2018-09-12 VITALS
RESPIRATION RATE: 18 BRPM | SYSTOLIC BLOOD PRESSURE: 145 MMHG | DIASTOLIC BLOOD PRESSURE: 94 MMHG | HEART RATE: 93 BPM | TEMPERATURE: 97.8 F

## 2018-09-12 RX ORDER — LIDOCAINE HYDROCHLORIDE 40 MG/ML
SOLUTION TOPICAL PRN
Status: DISCONTINUED | OUTPATIENT
Start: 2018-09-12 | End: 2018-09-13 | Stop reason: HOSPADM

## 2018-09-12 NOTE — PROGRESS NOTES
(LASIX) 40 MG tablet Take 1 tablet by mouth 2 times daily 60 tablet 1    nicotine (NICODERM CQ) 21 MG/24HR Place 1 patch onto the skin every 24 hours 30 patch 3    ergocalciferol (DRISDOL) 09957 UNITS capsule Take 1 capsule by mouth once a week 4 capsule 3     No current facility-administered medications on file prior to encounter. REVIEW OF SYSTEMS    A comprehensive review of systems was negative. Objective:      BP (!) 145/94   Pulse 93   Temp 97.8 °F (36.6 °C) (Oral)   Resp 18     Wt Readings from Last 3 Encounters:   08/10/18 (!) 321 lb 3.4 oz (145.7 kg)   08/08/18 (!) 314 lb (142.4 kg)   07/25/18 (!) 317 lb 7.4 oz (144 kg)       PHYSICAL EXAM    General Appearance: alert and oriented to person, place and time, well developed and well- nourished, morbid obesity, in no acute distress  Skin: warm and dry, no rash or erythema  Head: normocephalic and atraumatic  Eyes: pupils equal, round, and reactive to light, extraocular eye movements intact, conjunctivae normal  ENT:  external ear and ear canal normal bilaterally, nose without deformity,  Neck: supple and non-tender without mass, no thyromegaly or thyroid nodules, no cervical lymphadenopathy  Pulmonary/Chest: clear to auscultation bilaterally- no wheezes, rales or rhonchi, normal air movement, no respiratory distress  Cardiovascular: normal rate, regular rhythm, normal S1 and S2, no murmurs, rubs, clicks, or gallops,  Abdomen: soft, non-tender, non-distended, normal bowel sounds, no masses or organomegaly  Extremities: no cyanosis, clubbing. Severe edema both legs d/t lymphedema and cvi. Rt leg ulcers as scribed. No overt infection. Looks a bit better.   Neurologic:  no cranial nerve deficit, gait, coordination and speech normal      Assessment:      Patient Active Problem List   Diagnosis Code    Bilateral lower extremity edema R60.0    Shortness of breath R06.02    COPD (chronic obstructive pulmonary disease) ([facility]) J44.9    Leg ulcer, right, limited to breakdown of skin ([facility] [address] L97.911    Leg swelling M79.89    Lymphedema of both lower extremities I89.0    Tobacco abuse Z72.0    Venous (peripheral) insufficiency I87.2    Morbid obesity (HCC) E66.01        Procedure Note : none           Wound 08/10/18 Leg Right; Anterior; Lower; Lateral;Posterior #1 (wound appeared approximately 7/1/2018) (Active)   Wound Image    9/12/2018  9:35 AM   Wound Type Wound 9/12/2018  9:35 AM   Wound Venous 9/12/2018  9:35 AM   Dressing Status Intact; Old drainage 9/12/2018  9:35 AM   Dressing Changed Changed/New 9/12/2018 10:24 AM   Dressing/Treatment Other (Comment) 9/12/2018 10:24 AM   Wound Cleansed Rinsed/Irrigated with saline 8/10/2018 11:24 AM   Wound Length (cm) 5.5 cm 9/12/2018  9:35 AM   Wound Width (cm) 19.5 cm 9/12/2018  9:35 AM   Wound Depth (cm)  0.1 9/12/2018  9:35 AM   Calculated Wound Size (cm^2) (l*w) 107.25 cm^2 9/12/2018  9:35 AM   Change in Wound Size % (l*w) 49.07 9/12/2018  9:35 AM   Wound Assessment Granulation tissue;Slough 9/12/2018  9:35 AM   Drainage Amount Moderate 9/12/2018  9:35 AM   Drainage Description Serosanguinous 9/12/2018  9:35 AM   Odor None 9/12/2018  9:35 AM   Margins Attached edges 9/12/2018  9:35 AM   Azucena-wound Assessment Clean;Dry 9/12/2018  9:35 AM   Non-staged Wound Description Full thickness 9/12/2018  9:35 AM   [place]%Wound Bed 40 9/12/2018  9:35 AM   Yellow%Wound Bed 60 9/12/2018  9:35 AM   Op First Treatment Date 08/10/18 8/24/2018  9:15 AM   Number of days: 32             Plan:     Treatment Note please see attached Discharge Instructions    Written patient dismissal instructions given to patient and signed by patient or POA.          Discharge Instructions       Wound Clinic Physician Orders and Discharge Instructions  [address]   [address] [place] [address], [facility]  Telephone: [phone]      FAX [phone]     NAME: [address]:  1965  MEDICAL RECORD Nurse Signature        PLEASE NOTE: IF YOU ARE UNABLE TO OBTAIN WOUND SUPPLIES, CONTINUE TO USE THE SUPPLIES YOU HAVE AVAILABLE UNTIL YOU ARE ABLE TO REACH US.  IT IS MOST IMPORTANT TO KEEP THE WOUND COVERED AT ALL TIMES.     Physician Signature:_______________________     Date: ___________ Time:  ____________                    [] Dr Sheyla Gar    [] Dr Latoya Serrato CNP              [] Dr Riki Johnson  [] Dr Loly Levin   [] Dr Kennedi العراقي             [x] Dr Vivienne Kendall                [] Dr Val Begum   [] Rodger Greenfield NP            The information contained in the After Visit Summary has been reviewed with me, the patient and/or responsible adult, by my health care provider(s).  I had the opportunity to ask questions regarding this information.  I have elected to receive;        Patient Signature:_______________________     Date: ___________ Time:  ____________     [] Patient unable to sign Discharge Instructions given to ECF/Transportation/POA        [x]  After Visit Summary  []  Comprehensive Discharge Instruction        Electronically signed by Cris Hummel MD on 9/12/2018 at 11:10 AM

## 2018-09-26 ENCOUNTER — HOSPITAL ENCOUNTER (OUTPATIENT)
Dept: OTHER | Age: 53
Discharge: HOME | End: 2018-10-03
Attending: SURGERY | Admitting: SURGERY

## 2018-09-26 ENCOUNTER — HOSPITAL ENCOUNTER (OUTPATIENT)
Dept: WOUND CARE | Age: 53
Discharge: HOME | End: 2018-09-26

## 2018-10-03 ENCOUNTER — HOSPITAL ENCOUNTER (OUTPATIENT)
Dept: WOUND CARE | Age: 53
Discharge: HOME | End: 2018-10-03

## 2018-10-17 ENCOUNTER — HOSPITAL ENCOUNTER (OUTPATIENT)
Dept: WOUND CARE | Age: 53
Discharge: HOME | End: 2018-10-17
Payer: MEDICARE

## 2018-10-17 VITALS — BODY MASS INDEX: 47.34 KG/M2 | WEIGHT: 315 LBS

## 2018-10-17 DIAGNOSIS — L30.9 DERMATITIS: ICD-10-CM

## 2018-10-17 PROCEDURE — 87205 SMEAR GRAM STAIN: CPT

## 2018-10-17 PROCEDURE — 99213 OFFICE O/P EST LOW 20 MIN: CPT

## 2018-10-17 PROCEDURE — 87070 CULTURE OTHR SPECIMN AEROBIC: CPT

## 2018-10-17 RX ORDER — DOXYCYCLINE HYCLATE 100 MG
100 TABLET ORAL 2 TIMES DAILY
Qty: 20 TABLET | Refills: 0 | Status: SHIPPED | OUTPATIENT
Start: 2018-10-17 | End: 2018-10-27

## 2018-10-18 PROBLEM — L30.9 DERMATITIS: Status: ACTIVE | Noted: 2018-10-18

## 2018-10-18 NOTE — PROGRESS NOTES
Mary Carmen Steele 37   Progress Note and Procedure Note      Jason Serrato  MEDICAL RECORD NUMBER:  [id number]  AGE: 47 y.o. GENDER: male  : 1965  EPISODE DATE:  10/17/2018    Subjective:     Chief Complaint   Patient presents with    Wound Check     Follow up right lower leg wound         Returning Patient with a new problem. HISTORY of PRESENT ILLNESS HPI    Hamlet Mcgrath is a 47 y. o. male who presents today for wound/ulcer evaluation. History of Wound Context: venous.  The patient presented to the ED on 18 for RLE cellulitis.  At that time, hospitalization was recommended, but the patient refused. Taylor Key was treated with IV clindamycin in the ED and sent home with PO clindamycin. His wound culture was + for pseudomonas and he was treated  with Cipro. Wound/Ulcer Pain Timing/Severity: none  Quality of pain: N/A  Severity:  0 / 10   Modifying Factors: Pain worsens with local pressure and an increase in the amount of swelling in his LE  Associated Signs/Symptoms: edema, drainage     Ulcer Identification:  Ulcer Type: venous  Contributing Factors: edema, venous stasis, decreased mobility and obesity     Wound: N/A     10/17/18 : pt returns after several weeks of absence. Has a new problem with drainage dermatitis anterior distal rt leg proximal to the ankle. No f/ c/ n/ v/ d.         PAST MEDICAL HISTORY        Diagnosis Date    COPD (chronic obstructive pulmonary disease) ([address]        PAST SURGICAL HISTORY    Past Surgical History:   Procedure Laterality Date    EYE SURGERY Left     weak eye muscle       FAMILY HISTORY    Family History   Problem Relation Age of Onset    Other Mother         liver from drinking    Heart Disease Father        SOCIAL HISTORY    Social History   Substance Use Topics    Smoking status: Current Every Day Smoker     Packs/day: 1.00     Types: Cigarettes    Smokeless tobacco: Current User    Alcohol use 7.2 oz/week     12 Cans of beer per week deficit, gait, coordination and speech normal      Assessment:      Patient Active Problem List   Diagnosis Code    Bilateral lower extremity edema R60.0    Shortness of breath R06.02    COPD (chronic obstructive pulmonary disease) ([facility]) J44.9    Leg ulcer, right, limited to breakdown of skin ([facility]) L97.911    Leg swelling M79.89    Lymphedema of both lower extremities I89.0    Tobacco abuse Z72.0    Venous (peripheral) insufficiency I87.2    Morbid obesity ([facility] [address] E66.01    Dermatitis L30.9        Procedure Note : none      Wound 08/10/18 Leg Right; Anterior; Lower; Lateral;Posterior #1 (wound appeared approximately 7/1/2018) (Active)   Wound Image   10/17/2018  9:29 AM   Wound Type Wound 9/12/2018  9:35 AM   Wound Venous 9/12/2018  9:35 AM   Dressing Status Intact; Old drainage 9/12/2018  9:35 AM   Dressing Changed Changed/New 9/12/2018 10:24 AM   Dressing/Treatment Other (Comment) 9/12/2018 10:24 AM   Wound Cleansed Rinsed/Irrigated with saline 8/10/2018 11:24 AM   Wound Length (cm) 0 cm 10/17/2018  9:25 AM   Wound Width (cm) 0 cm 10/17/2018  9:25 AM   Wound Depth (cm)  0 10/17/2018  9:25 AM   Calculated Wound Size (cm^2) (l*w) 0 cm^2 10/17/2018  9:25 AM   Change in Wound Size % (l*w) 100 10/17/2018  9:25 AM   Wound Assessment Epithelialization 10/17/2018  9:25 AM   Drainage Amount Moderate 9/12/2018  9:35 AM   Drainage Description Serosanguinous 9/12/2018  9:35 AM   Odor None 9/12/2018  9:35 AM   Margins Attached edges 9/12/2018  9:35 AM   Azucena-wound Assessment Clean;Dry 9/12/2018  9:35 AM   Non-staged Wound Description Full thickness 9/12/2018  9:35 AM   [place]%Wound Bed 40 9/12/2018  9:35 AM   Yellow%Wound Bed 60 9/12/2018  9:35 AM   Op First Treatment Date 08/10/18 8/24/2018  9:15 AM   Number of days: 69       Wound 10/17/18 Leg Right; Lower; Anterior; Lateral # 2 right lower leg anterior lateral noted about 2 weeks ago (Active)   Wound Image   10/17/2018  9:29 AM   Wound Type Wound 10/17/2018  9:29 AM   Wound Migue Thomas NP            The information contained in the After Visit Summary has been reviewed with me, the patient and/or responsible adult, by my health care provider(s).  I had the opportunity to ask questions regarding this information.  I have elected to receive;        Patient Signature:_______________________     Date: ___________ Time:  ____________     [] Patient unable to sign Discharge Instructions given to ECF/Transportation/POA        [x]  After Visit Summary  []  Comprehensive Discharge Instruction        Electronically signed by Josiah De La Torre MD on 10/18/2018 at 1:48 PM

## 2018-10-19 LAB
GRAM STAIN RESULT: ABNORMAL
WOUND/ABSCESS: ABNORMAL

## 2018-10-31 ENCOUNTER — HOSPITAL ENCOUNTER (OUTPATIENT)
Dept: WOUND CARE | Age: 53
Discharge: HOME | End: 2018-10-31
Payer: MEDICARE

## 2018-10-31 VITALS
DIASTOLIC BLOOD PRESSURE: 94 MMHG | SYSTOLIC BLOOD PRESSURE: 161 MMHG | TEMPERATURE: 97.8 F | HEART RATE: 111 BPM | RESPIRATION RATE: 18 BRPM

## 2018-10-31 DIAGNOSIS — I87.2 VENOUS (PERIPHERAL) INSUFFICIENCY: ICD-10-CM

## 2018-10-31 DIAGNOSIS — I89.0 LYMPHEDEMA OF BOTH LOWER EXTREMITIES: Primary | ICD-10-CM

## 2018-10-31 DIAGNOSIS — L97.911 LEG ULCER, RIGHT, LIMITED TO BREAKDOWN OF SKIN (HCC): ICD-10-CM

## 2018-10-31 PROCEDURE — 99212 OFFICE O/P EST SF 10 MIN: CPT | Performed by: NURSE PRACTITIONER

## 2018-10-31 PROCEDURE — 99212 OFFICE O/P EST SF 10 MIN: CPT

## 2018-10-31 NOTE — PROGRESS NOTES
Mary Carmen Steele 37   Progress Note and Procedure Note      Gisela Alcala  MEDICAL RECORD NUMBER:  [id number]  AGE: 47 y.o. GENDER: male  : 1965  EPISODE DATE:  10/31/2018    Subjective:     Chief Complaint   Patient presents with    Wound Check     wound right lower leg         HISTORY of PRESENT ILLNESS HPI  Hamlet Mcgrath is a 47 y. o. male who presents today for wound/ulcer evaluation. Wound right lower leg healed. History of Wound Context: venous.  The patient presented to the ED on 18 for RLE cellulitis.  At that time, hospitalization was recommended, but the patient refused. Jazzy Franco was treated with IV clindamycin in the ED and sent home with PO clindamycin. His wound culture was + for pseudomonas and he was treated  with Cipro. 10/17/18 : pt returns after several weeks of absence. Had new problem with drainage dermatitis anterior distal rt leg proximal to the ankle. No f/ c/ n/ v/ d.   Wound/Ulcer Pain Timing/Severity: none  Quality of pain: N/A  Severity:  0 / 10   Modifying Factors: Pain worsens with local pressure and an increase in the amount of swelling in his LE  Associated Signs/Symptoms: edema, drainage     Ulcer Identification:  Ulcer Type: venous  Contributing Factors: edema, venous stasis, decreased mobility and obesity     Wound: N/A       PAST MEDICAL HISTORY        Diagnosis Date    COPD (chronic obstructive pulmonary disease) (HCC)        PAST SURGICAL HISTORY    Past Surgical History:   Procedure Laterality Date    EYE SURGERY Left 1968    weak eye muscle       FAMILY HISTORY    Family History   Problem Relation Age of Onset    Other Mother         liver from drinking    Heart Disease Father        SOCIAL HISTORY    Social History   Substance Use Topics    Smoking status: Current Every Day Smoker     Packs/day: 1.00     Types: Cigarettes    Smokeless tobacco: Current User    Alcohol use 7.2 oz/week     12 Cans of beer per week      Comment: quit liquor about Wound Type Wound 10/31/2018  9:12 AM   Wound Venous 10/17/2018  9:29 AM   Dressing Status Intact; Old drainage 10/17/2018  9:29 AM   Dressing Changed Changed/New 10/31/2018 10:00 AM   Dressing/Treatment Other (Comment) 10/31/2018 10:00 AM   Wound Length (cm) 0 cm 10/31/2018  9:12 AM   Wound Width (cm) 0 cm 10/31/2018  9:12 AM   Wound Depth (cm)  0 10/31/2018  9:12 AM   Calculated Wound Size (cm^2) (l*w) 0 cm^2 10/31/2018  9:12 AM   Change in Wound Size % (l*w) 100 10/31/2018  9:12 AM   Wound Assessment Epithelialization 10/31/2018  9:12 AM   Drainage Amount None 10/31/2018  9:12 AM   Drainage Description Serosanguinous 10/17/2018  9:29 AM   Odor None 10/31/2018  9:12 AM   Margins Undefined edges 10/17/2018  9:29 AM   Azucena-wound Assessment Pink 10/17/2018  9:29 AM   Non-staged Wound Description Full thickness 10/17/2018  9:29 AM   [place]%Wound Bed 50 10/17/2018  9:29 AM   Yellow%Wound Bed 50 10/17/2018  9:29 AM   Culture Taken Yes 10/17/2018  9:29 AM   Op First Treatment Date 10/17/18 10/17/2018  9:29 AM   Number of days: 14       Plan:   Pt education per provider related to follow-up care for legs. Pt is unable to apply any compression to include feel as he is unable to reach. Uses ace wraps which he applies from ankle to knee. Gave pt order to see specialist to evaluate and measure for compression , 20-30 mmHg. Questions answered. Treatment Note please see attached Discharge Instructions    Written patient dismissal instructions given to patient and signed by patient or POA. Discharge Instructions         [address] Physician Orders   [facility] AT [address], [place] [address]  Telephone: [phone]    NAME:  Kayla Brock OF BIRTH:  1965  MEDICAL RECORD NUMBER:  [id number]  DATE:  10/31/2018    Congratulations! You have completed your treatment.      1. Return to your Primary Care Physician for all

## 2018-11-07 ENCOUNTER — OFFICE VISIT (OUTPATIENT)
Dept: INTERNAL MEDICINE CLINIC | Age: 53
End: 2018-11-07
Payer: MEDICARE

## 2018-11-07 VITALS
BODY MASS INDEX: 46.37 KG/M2 | RESPIRATION RATE: 12 BRPM | HEART RATE: 110 BPM | DIASTOLIC BLOOD PRESSURE: 91 MMHG | WEIGHT: 314 LBS | OXYGEN SATURATION: 97 % | SYSTOLIC BLOOD PRESSURE: 148 MMHG

## 2018-11-07 DIAGNOSIS — Z23 NEED FOR PROPHYLACTIC VACCINATION AND INOCULATION AGAINST VARICELLA: ICD-10-CM

## 2018-11-07 DIAGNOSIS — I10 ESSENTIAL HYPERTENSION: ICD-10-CM

## 2018-11-07 DIAGNOSIS — Z11.4 SCREENING FOR HIV (HUMAN IMMUNODEFICIENCY VIRUS): ICD-10-CM

## 2018-11-07 DIAGNOSIS — Z23 NEED FOR INFLUENZA VACCINATION: ICD-10-CM

## 2018-11-07 DIAGNOSIS — Z12.11 SCREENING FOR COLON CANCER: ICD-10-CM

## 2018-11-07 DIAGNOSIS — Z13.1 SCREENING FOR DIABETES MELLITUS: ICD-10-CM

## 2018-11-07 DIAGNOSIS — Z00.01 ENCOUNTER FOR WELL ADULT EXAM WITH ABNORMAL FINDINGS: Primary | ICD-10-CM

## 2018-11-07 DIAGNOSIS — Z23 NEED FOR PROPHYLACTIC VACCINATION AGAINST STREPTOCOCCUS PNEUMONIAE (PNEUMOCOCCUS): ICD-10-CM

## 2018-11-07 PROCEDURE — 90682 RIV4 VACC RECOMBINANT DNA IM: CPT | Performed by: NURSE PRACTITIONER

## 2018-11-07 PROCEDURE — 99396 PREV VISIT EST AGE 40-64: CPT | Performed by: NURSE PRACTITIONER

## 2018-11-07 PROCEDURE — 90472 IMMUNIZATION ADMIN EACH ADD: CPT | Performed by: NURSE PRACTITIONER

## 2018-11-07 PROCEDURE — 90471 IMMUNIZATION ADMIN: CPT | Performed by: NURSE PRACTITIONER

## 2018-11-07 PROCEDURE — 90732 PPSV23 VACC 2 YRS+ SUBQ/IM: CPT | Performed by: NURSE PRACTITIONER

## 2018-11-07 PROCEDURE — G8482 FLU IMMUNIZE ORDER/ADMIN: HCPCS | Performed by: NURSE PRACTITIONER

## 2018-11-07 RX ORDER — FUROSEMIDE 40 MG/1
40 TABLET ORAL 2 TIMES DAILY
Qty: 60 TABLET | Refills: 1 | Status: SHIPPED | OUTPATIENT
Start: 2018-11-07 | End: 2018-12-12 | Stop reason: SDUPTHER

## 2018-11-07 RX ORDER — LISINOPRIL 20 MG/1
20 TABLET ORAL DAILY
Qty: 30 TABLET | Refills: 0 | Status: SHIPPED | OUTPATIENT
Start: 2018-11-07 | End: 2018-12-12 | Stop reason: SDUPTHER

## 2018-11-07 ASSESSMENT — ENCOUNTER SYMPTOMS
RHINORRHEA: 0
SORE THROAT: 0
COUGH: 0
BACK PAIN: 0
NAUSEA: 0
VOMITING: 0
DIARRHEA: 0

## 2018-11-07 NOTE — PROGRESS NOTES
[address] 100 03/07/2017    GLUCOSE 103 07/25/2018    LABA1C 5.6 11/07/2018    LABA1C 6.1 03/07/2017       The 10-year ASCVD risk score (Kari Vazquez et al., 2013) is: 9.9%    Values used to calculate the score:      Age: 47 years      Sex: Male      Is Non- : No      Diabetic: No      Tobacco smoker: Yes      Systolic Blood Pressure: 797 mmHg      Is BP treated: No      HDL Cholesterol: 47 mg/dL      Total Cholesterol: 175 mg/dL    Immunization History   Administered Date(s) Administered    Influenza Vaccine, unspecified formulation 10/13/2009    Influenza, Quadv, Recombinant, IM PF (Flublok 18 yrs and older) 11/07/2018    Pneumococcal Polysaccharide ([id number]) 11/07/2018    Tdap (Boostrix, Adacel) 05/07/2009       Health Maintenance   Topic Date Due    Shingles Vaccine (1 of 2 - 2 Dose Series) 06/21/2016    Colon cancer screen colonoscopy  06/21/2016    DTaP/Tdap/Td vaccine (2 - Td) 05/07/2019    Potassium monitoring  07/25/2019    Creatinine monitoring  07/25/2019    Diabetes screen  11/07/2021    Lipid screen  03/07/2022    Flu vaccine  Completed    Pneumococcal med risk  Completed    HIV screen  Completed       ASSESSMENT/PLAN:    1. Encounter for well adult exam with abnormal findings      2. Essential hypertension    - lisinopril (PRINIVIL;ZESTRIL) 20 MG tablet; Take 1 tablet by mouth daily  Dispense: 30 tablet; Refill: 0  - furosemide (LASIX) 40 MG tablet; Take 1 tablet by mouth 2 times daily  Dispense: 60 tablet; Refill: 1    3. Screening for colon cancer    - COLOGUARD; Future    4. Need for prophylactic vaccination against Streptococcus pneumoniae (pneumococcus)    - Pneumococcal polysaccharide vaccine 23-valent PPSV23    5. Need for prophylactic vaccination and inoculation against varicella    - zoster recombinant adjuvanted vaccine [facility]) 50 MCG/0.5ML SUSR injection; Inject 0.5 mLs into the muscle once for 1 dose 50 MCG IM then repeat 2-6 months.   Dispense: 1

## 2018-11-08 LAB
ESTIMATED AVERAGE GLUCOSE: 114 MG/DL
HBA1C MFR BLD: 5.6 %
HIV AG/AB: NORMAL
HIV ANTIGEN: NORMAL
HIV-1 ANTIBODY: NORMAL
HIV-2 AB: NORMAL

## 2018-11-09 ENCOUNTER — TELEPHONE (OUTPATIENT)
Dept: INTERNAL MEDICINE CLINIC | Age: 53
End: 2018-11-09

## 2018-11-09 DIAGNOSIS — N52.9 ERECTILE DYSFUNCTION, UNSPECIFIED ERECTILE DYSFUNCTION TYPE: Primary | ICD-10-CM

## 2018-11-09 RX ORDER — SILDENAFIL 50 MG/1
50 TABLET, FILM COATED ORAL PRN
Qty: 30 TABLET | Refills: 0 | Status: SHIPPED | OUTPATIENT
Start: 2018-11-09

## 2018-12-12 ENCOUNTER — OFFICE VISIT (OUTPATIENT)
Dept: INTERNAL MEDICINE CLINIC | Age: 53
End: 2018-12-12
Payer: MEDICARE

## 2018-12-12 VITALS
WEIGHT: 312.8 LBS | HEART RATE: 106 BPM | RESPIRATION RATE: 12 BRPM | DIASTOLIC BLOOD PRESSURE: 94 MMHG | SYSTOLIC BLOOD PRESSURE: 140 MMHG | OXYGEN SATURATION: 98 % | BODY MASS INDEX: 46.19 KG/M2

## 2018-12-12 DIAGNOSIS — I10 ESSENTIAL HYPERTENSION: Primary | ICD-10-CM

## 2018-12-12 DIAGNOSIS — L23.5 ALLERGIC DERMATITIS DUE TO OTHER CHEMICAL PRODUCT: ICD-10-CM

## 2018-12-12 DIAGNOSIS — Z23 NEED FOR PROPHYLACTIC VACCINATION AND INOCULATION AGAINST VARICELLA: ICD-10-CM

## 2018-12-12 DIAGNOSIS — Z12.11 SCREENING FOR COLON CANCER: ICD-10-CM

## 2018-12-12 DIAGNOSIS — N52.9 ERECTILE DYSFUNCTION, UNSPECIFIED ERECTILE DYSFUNCTION TYPE: ICD-10-CM

## 2018-12-12 PROCEDURE — 99214 OFFICE O/P EST MOD 30 MIN: CPT | Performed by: NURSE PRACTITIONER

## 2018-12-12 PROCEDURE — 3017F COLORECTAL CA SCREEN DOC REV: CPT | Performed by: NURSE PRACTITIONER

## 2018-12-12 PROCEDURE — G8417 CALC BMI ABV UP PARAM F/U: HCPCS | Performed by: NURSE PRACTITIONER

## 2018-12-12 PROCEDURE — G8427 DOCREV CUR MEDS BY ELIG CLIN: HCPCS | Performed by: NURSE PRACTITIONER

## 2018-12-12 PROCEDURE — G8482 FLU IMMUNIZE ORDER/ADMIN: HCPCS | Performed by: NURSE PRACTITIONER

## 2018-12-12 PROCEDURE — 4004F PT TOBACCO SCREEN RCVD TLK: CPT | Performed by: NURSE PRACTITIONER

## 2018-12-12 RX ORDER — FUROSEMIDE 40 MG/1
40 TABLET ORAL 2 TIMES DAILY
Qty: 60 TABLET | Refills: 3 | Status: SHIPPED | OUTPATIENT
Start: 2018-12-12

## 2018-12-12 RX ORDER — LISINOPRIL 20 MG/1
20 TABLET ORAL DAILY
Qty: 30 TABLET | Refills: 3 | Status: SHIPPED | OUTPATIENT
Start: 2018-12-12

## 2018-12-12 RX ORDER — PREDNISONE 20 MG/1
40 TABLET ORAL DAILY
Qty: 10 TABLET | Refills: 0 | Status: SHIPPED | OUTPATIENT
Start: 2018-12-12 | End: 2018-12-17

## 2018-12-12 RX ORDER — SKIN PROTECTANT 44 G/100G
OINTMENT TOPICAL 3 TIMES DAILY PRN
Qty: 454 G | Refills: 0 | Status: SHIPPED | OUTPATIENT
Start: 2018-12-12

## 2018-12-12 RX ORDER — AMLODIPINE BESYLATE 5 MG/1
5 TABLET ORAL DAILY
Qty: 30 TABLET | Refills: 3 | Status: SHIPPED | OUTPATIENT
Start: 2018-12-12

## 2018-12-12 NOTE — PROGRESS NOTES
by mouth 2 times daily Yes MARY Arredondo CNP   hydrocortisone 2.5 % cream Apply topically 2 times daily. Yes Jackie Wilkinson MD   sildenafil (VIAGRA) 50 MG tablet Take 1 tablet by mouth as needed for Erectile Dysfunction  MARY Arredondo CNP        Social History   Substance Use Topics    Smoking status: Current Every Day Smoker     Packs/day: 1.00     Types: Cigarettes    Smokeless tobacco: Current User    Alcohol use 7.2 oz/week     12 Cans of beer per week      Comment: quit liquor about 6 months ago        Vitals:    12/12/18 0931 12/12/18 0935   BP: (!) 154/91 (!) 140/94   Pulse: 106    Resp: 12    SpO2: 98%    Weight: (!) 312 lb 12.8 oz (141.9 kg)      Estimated body mass index is 46.19 kg/m² as calculated from the following:    Height as of 8/10/18: 5' 9\" (1.753 m). Weight as of this encounter: 312 lb 12.8 oz (141.9 kg). Physical Exam   Constitutional: He is oriented to person, place, and time. He appears well-developed. HENT:   Head: Normocephalic. Right Ear: Tympanic membrane and ear canal normal.   Left Ear: Tympanic membrane and ear canal normal.   Mouth/Throat: Uvula is midline and oropharynx is clear and moist.   Eyes: Conjunctivae, EOM and lids are normal.   Neck: Normal range of motion. Neck supple. Cardiovascular: Regular rhythm and normal heart sounds. Pulmonary/Chest: Effort normal and breath sounds normal.   Abdominal: Soft. Normal appearance and bowel sounds are normal. There is no hepatosplenomegaly. Musculoskeletal: Normal range of motion. FROM X4 EXT   Lymphadenopathy:     He has no cervical adenopathy. Neurological: He is alert and oriented to person, place, and time. Skin: Skin is warm and dry. Rash (noted on face, torso and arms) noted. Rash is urticarial (rash noted on chest and arms x 3-4 days). There is erythema (legs bilaterally with +2 pitting edema, however significantly improved). Psychiatric: He has a normal mood and affect. ASSESSMENT/PLAN:    1. Essential hypertension  -unstable  - lisinopril (PRINIVIL;ZESTRIL) 20 MG tablet; Take 1 tablet by mouth daily  Dispense: 30 tablet; Refill: 3  - amLODIPine (NORVASC) 5 MG tablet; Take 1 tablet by mouth daily  Dispense: 30 tablet; Refill: 3  - furosemide (LASIX) 40 MG tablet; Take 1 tablet by mouth 2 times daily  Dispense: 60 tablet; Refill: 3    2. Allergic dermatitis due to other chemical product    - predniSONE (DELTASONE) 20 MG tablet; Take 2 tablets by mouth daily for 5 days  Dispense: 10 tablet; Refill: 0    3. Erectile dysfunction, unspecified erectile dysfunction type  -patient reports improvement with sildenalfil, continue to use as needed    4. Screening for colon cancer  -ordered cologuard kit, will be mailed to patient    5. Need for prophylactic vaccination and inoculation against varicella        Return in about 2 months (around 2/12/2019) for f/u with Ankit Ramirez CNP extended visit, HTN, prediabetes. An electronic signature was used to authenticate this note.     --MARY Gaviria - CNP on 12/26/2018 at 2:06 PM

## 2018-12-12 NOTE — PATIENT INSTRUCTIONS
stroke. If you need help quitting, talk to your doctor about stop-smoking programs and medicines. These can increase your chances of quitting for good. When should you call for help? Call 911 anytime you think you may need emergency care. This may mean having symptoms that suggest that your blood pressure is causing a serious heart or blood vessel problem. Your blood pressure may be over 180/120.   For example, call 911 if:    · You have symptoms of a heart attack. These may include:  ? Chest pain or pressure, or a strange feeling in the chest.  ? Sweating. ? Shortness of breath. ? Nausea or vomiting. ? Pain, pressure, or a strange feeling in the back, neck, jaw, or upper belly or in one or both shoulders or arms. ? Lightheadedness or sudden weakness. ? A fast or irregular heartbeat.     · You have symptoms of a stroke. These may include:  ? Sudden numbness, tingling, weakness, or loss of movement in your face, arm, or leg, especially on only one side of your body. ? Sudden vision changes. ? Sudden trouble speaking. ? Sudden confusion or trouble understanding simple statements. ? Sudden problems with walking or balance. ? A sudden, severe headache that is different from past headaches.     · You have severe back or belly pain.    Do not wait until your blood pressure comes down on its own. Get help right away.   Call your doctor now or seek immediate care if:    · Your blood pressure is much higher than normal (such as 180/120 or higher), but you don't have symptoms.     · You think high blood pressure is causing symptoms, such as:  ? Severe headache.  ? Blurry vision.    Watch closely for changes in your health, and be sure to contact your doctor if:    · Your blood pressure measures higher than your doctor recommends at least 2 times. That means the top number is higher or the bottom number is higher, or both.     · You think you may be having side effects from your blood pressure medicine.    Where can

## 2018-12-26 ASSESSMENT — ENCOUNTER SYMPTOMS
VOMITING: 0
RHINORRHEA: 0
COUGH: 0
SORE THROAT: 0
DIARRHEA: 0
NAUSEA: 0

## 2024-07-08 ENCOUNTER — APPOINTMENT (OUTPATIENT)
Dept: GENERAL RADIOLOGY | Age: 59
End: 2024-07-08
Payer: MEDICARE

## 2024-07-08 ENCOUNTER — HOSPITAL ENCOUNTER (INPATIENT)
Age: 59
LOS: 3 days | End: 2024-07-11
Attending: FAMILY MEDICINE | Admitting: FAMILY MEDICINE
Payer: MEDICARE

## 2024-07-08 DIAGNOSIS — R74.8 ELEVATED CK: Primary | ICD-10-CM

## 2024-07-08 DIAGNOSIS — I50.9 ACUTE ON CHRONIC CONGESTIVE HEART FAILURE, UNSPECIFIED HEART FAILURE TYPE (HCC): ICD-10-CM

## 2024-07-08 DIAGNOSIS — R79.9 ELEVATED BUN: ICD-10-CM

## 2024-07-08 DIAGNOSIS — R60.0 BILATERAL LEG EDEMA: ICD-10-CM

## 2024-07-08 DIAGNOSIS — R26.2 UNABLE TO AMBULATE: ICD-10-CM

## 2024-07-08 DIAGNOSIS — E88.09 HYPOALBUMINEMIA: ICD-10-CM

## 2024-07-08 PROBLEM — I50.23 ACUTE ON CHRONIC SYSTOLIC CHF, NYHA CLASS 2 AND ACC/AHA STAGE C (HCC): Status: ACTIVE | Noted: 2024-07-08

## 2024-07-08 LAB
ALBUMIN SERPL-MCNC: 2.8 G/DL (ref 3.4–5)
ALBUMIN/GLOB SERPL: 0.6 {RATIO} (ref 1.1–2.2)
ALP SERPL-CCNC: 91 U/L (ref 40–129)
ALT SERPL-CCNC: 30 U/L (ref 10–40)
ANION GAP SERPL CALCULATED.3IONS-SCNC: 14 MMOL/L (ref 3–16)
AST SERPL-CCNC: 34 U/L (ref 15–37)
BACTERIA URNS QL MICRO: NORMAL /HPF
BASE EXCESS BLDV CALC-SCNC: -7.6 MMOL/L
BASOPHILS # BLD: 0 K/UL (ref 0–0.2)
BASOPHILS NFR BLD: 0 %
BILIRUB SERPL-MCNC: 0.6 MG/DL (ref 0–1)
BILIRUB UR QL STRIP.AUTO: NEGATIVE
BUN SERPL-MCNC: 46 MG/DL (ref 7–20)
CALCIUM SERPL-MCNC: 9.7 MG/DL (ref 8.3–10.6)
CHLORIDE SERPL-SCNC: 112 MMOL/L (ref 99–110)
CK SERPL-CCNC: 394 U/L (ref 39–308)
CLARITY UR: CLEAR
CO2 BLDV-SCNC: 20 MMOL/L
CO2 SERPL-SCNC: 16 MMOL/L (ref 21–32)
COHGB MFR BLDV: 1.6 %
COLOR UR: YELLOW
CREAT SERPL-MCNC: 0.6 MG/DL (ref 0.9–1.3)
DEPRECATED RDW RBC AUTO: 16.4 % (ref 12.4–15.4)
EOSINOPHIL # BLD: 0 K/UL (ref 0–0.6)
EOSINOPHIL NFR BLD: 0 %
EPI CELLS #/AREA URNS AUTO: 5 /HPF (ref 0–5)
GFR SERPLBLD CREATININE-BSD FMLA CKD-EPI: >90 ML/MIN/{1.73_M2}
GLUCOSE BLD-MCNC: 109 MG/DL (ref 70–99)
GLUCOSE SERPL-MCNC: 103 MG/DL (ref 70–99)
GLUCOSE UR STRIP.AUTO-MCNC: NEGATIVE MG/DL
HCO3 BLDV-SCNC: 19 MMOL/L (ref 23–29)
HCT VFR BLD AUTO: 28.4 % (ref 40.5–52.5)
HGB BLD-MCNC: 9.4 G/DL (ref 13.5–17.5)
HGB UR QL STRIP.AUTO: NEGATIVE
HYALINE CASTS #/AREA URNS AUTO: 0 /LPF (ref 0–8)
KETONES UR STRIP.AUTO-MCNC: ABNORMAL MG/DL
LACTATE BLDV-SCNC: 1.3 MMOL/L (ref 0.4–1.9)
LEUKOCYTE ESTERASE UR QL STRIP.AUTO: NEGATIVE
LYMPHOCYTES # BLD: 1 K/UL (ref 1–5.1)
LYMPHOCYTES NFR BLD: 10 %
MAGNESIUM SERPL-MCNC: 1.8 MG/DL (ref 1.8–2.4)
MCH RBC QN AUTO: 29.7 PG (ref 26–34)
MCHC RBC AUTO-ENTMCNC: 33 G/DL (ref 31–36)
MCV RBC AUTO: 90 FL (ref 80–100)
METAMYELOCYTES NFR BLD MANUAL: 2 %
METHGB MFR BLDV: 1 %
MONOCYTES # BLD: 1.4 K/UL (ref 0–1.3)
MONOCYTES NFR BLD: 14 %
MYELOCYTES NFR BLD MANUAL: 3 %
NEUTROPHILS # BLD: 7.4 K/UL (ref 1.7–7.7)
NEUTROPHILS NFR BLD: 70 %
NITRITE UR QL STRIP.AUTO: NEGATIVE
NT-PROBNP SERPL-MCNC: 1314 PG/ML (ref 0–124)
O2 THERAPY: ABNORMAL
PCO2 BLDV: 40 MMHG (ref 40–50)
PERFORMED ON: ABNORMAL
PH BLDV: 7.28 [PH] (ref 7.35–7.45)
PH UR STRIP.AUTO: 6.5 [PH] (ref 5–8)
PLATELET # BLD AUTO: 334 K/UL (ref 135–450)
PLATELET BLD QL SMEAR: ADEQUATE
PMV BLD AUTO: 6.9 FL (ref 5–10.5)
PO2 BLDV: 37 MMHG
POTASSIUM SERPL-SCNC: 3.5 MMOL/L (ref 3.5–5.1)
PROMYELOCYTES NFR BLD MANUAL: 1 %
PROT SERPL-MCNC: 7.2 G/DL (ref 6.4–8.2)
PROT UR STRIP.AUTO-MCNC: 30 MG/DL
RBC # BLD AUTO: 3.15 M/UL (ref 4.2–5.9)
RBC CLUMPS #/AREA URNS AUTO: 3 /HPF (ref 0–4)
RBC MORPH BLD: NORMAL
SAO2 % BLDV: 63 %
SLIDE REVIEW: ABNORMAL
SODIUM SERPL-SCNC: 142 MMOL/L (ref 136–145)
SP GR UR STRIP.AUTO: 1.01 (ref 1–1.03)
UA COMPLETE W REFLEX CULTURE PNL UR: ABNORMAL
UA DIPSTICK W REFLEX MICRO PNL UR: YES
URN SPEC COLLECT METH UR: ABNORMAL
UROBILINOGEN UR STRIP-ACNC: 1 E.U./DL
WBC # BLD AUTO: 9.8 K/UL (ref 4–11)
WBC #/AREA URNS AUTO: 2 /HPF (ref 0–5)

## 2024-07-08 PROCEDURE — 71045 X-RAY EXAM CHEST 1 VIEW: CPT

## 2024-07-08 PROCEDURE — 6360000002 HC RX W HCPCS: Performed by: PHYSICIAN ASSISTANT

## 2024-07-08 PROCEDURE — 96374 THER/PROPH/DIAG INJ IV PUSH: CPT

## 2024-07-08 PROCEDURE — 82803 BLOOD GASES ANY COMBINATION: CPT

## 2024-07-08 PROCEDURE — 96376 TX/PRO/DX INJ SAME DRUG ADON: CPT

## 2024-07-08 PROCEDURE — 96375 TX/PRO/DX INJ NEW DRUG ADDON: CPT

## 2024-07-08 PROCEDURE — 96361 HYDRATE IV INFUSION ADD-ON: CPT

## 2024-07-08 PROCEDURE — 93005 ELECTROCARDIOGRAM TRACING: CPT | Performed by: FAMILY MEDICINE

## 2024-07-08 PROCEDURE — 83880 ASSAY OF NATRIURETIC PEPTIDE: CPT

## 2024-07-08 PROCEDURE — 99285 EMERGENCY DEPT VISIT HI MDM: CPT

## 2024-07-08 PROCEDURE — 1200000000 HC SEMI PRIVATE

## 2024-07-08 PROCEDURE — 2580000003 HC RX 258: Performed by: PHYSICIAN ASSISTANT

## 2024-07-08 PROCEDURE — 83735 ASSAY OF MAGNESIUM: CPT

## 2024-07-08 PROCEDURE — 83605 ASSAY OF LACTIC ACID: CPT

## 2024-07-08 PROCEDURE — 36415 COLL VENOUS BLD VENIPUNCTURE: CPT

## 2024-07-08 PROCEDURE — 81001 URINALYSIS AUTO W/SCOPE: CPT

## 2024-07-08 PROCEDURE — 80053 COMPREHEN METABOLIC PANEL: CPT

## 2024-07-08 PROCEDURE — 85025 COMPLETE CBC W/AUTO DIFF WBC: CPT

## 2024-07-08 PROCEDURE — 82550 ASSAY OF CK (CPK): CPT

## 2024-07-08 PROCEDURE — 6370000000 HC RX 637 (ALT 250 FOR IP): Performed by: PHYSICIAN ASSISTANT

## 2024-07-08 RX ORDER — MORPHINE SULFATE 4 MG/ML
4 INJECTION, SOLUTION INTRAMUSCULAR; INTRAVENOUS ONCE
Status: COMPLETED | OUTPATIENT
Start: 2024-07-08 | End: 2024-07-08

## 2024-07-08 RX ORDER — OXYCODONE HYDROCHLORIDE 5 MG/1
5 TABLET ORAL ONCE
Status: COMPLETED | OUTPATIENT
Start: 2024-07-08 | End: 2024-07-08

## 2024-07-08 RX ORDER — 0.9 % SODIUM CHLORIDE 0.9 %
3000 INTRAVENOUS SOLUTION INTRAVENOUS ONCE
Status: COMPLETED | OUTPATIENT
Start: 2024-07-08 | End: 2024-07-08

## 2024-07-08 RX ORDER — DIAZEPAM 5 MG/1
5 TABLET ORAL ONCE
Status: COMPLETED | OUTPATIENT
Start: 2024-07-08 | End: 2024-07-08

## 2024-07-08 RX ORDER — ONDANSETRON 2 MG/ML
4 INJECTION INTRAMUSCULAR; INTRAVENOUS ONCE
Status: COMPLETED | OUTPATIENT
Start: 2024-07-08 | End: 2024-07-08

## 2024-07-08 RX ADMIN — SODIUM CHLORIDE 3000 ML: 9 INJECTION, SOLUTION INTRAVENOUS at 19:38

## 2024-07-08 RX ADMIN — DIAZEPAM 5 MG: 5 TABLET ORAL at 21:10

## 2024-07-08 RX ADMIN — ONDANSETRON 4 MG: 2 INJECTION INTRAMUSCULAR; INTRAVENOUS at 22:07

## 2024-07-08 RX ADMIN — OXYCODONE HYDROCHLORIDE 5 MG: 5 TABLET ORAL at 21:11

## 2024-07-08 RX ADMIN — MORPHINE SULFATE 4 MG: 4 INJECTION, SOLUTION INTRAMUSCULAR; INTRAVENOUS at 20:22

## 2024-07-08 RX ADMIN — ONDANSETRON 4 MG: 2 INJECTION INTRAMUSCULAR; INTRAVENOUS at 20:20

## 2024-07-08 ASSESSMENT — PAIN DESCRIPTION - ORIENTATION: ORIENTATION: RIGHT

## 2024-07-08 ASSESSMENT — LIFESTYLE VARIABLES
HOW MANY STANDARD DRINKS CONTAINING ALCOHOL DO YOU HAVE ON A TYPICAL DAY: PATIENT DOES NOT DRINK
HOW OFTEN DO YOU HAVE A DRINK CONTAINING ALCOHOL: NEVER

## 2024-07-08 ASSESSMENT — PAIN SCALES - GENERAL
PAINLEVEL_OUTOF10: 10
PAINLEVEL_OUTOF10: 8
PAINLEVEL_OUTOF10: 10
PAINLEVEL_OUTOF10: 8

## 2024-07-08 ASSESSMENT — PAIN DESCRIPTION - LOCATION: LOCATION: LEG

## 2024-07-08 NOTE — ED TRIAGE NOTES
Pt arrived to the ED via [facility], pt was found down by his daughter from [place], pt has skin tear to both elbows, abrasions to left hip,. Bandages were cut off both legs, left foot has a large area on top of foot that appears to be black and white and multiple wounds,  pt has maggots and multiple wounds on right foot,  there is a very strong odor, vss febrile

## 2024-07-09 ENCOUNTER — APPOINTMENT (OUTPATIENT)
Age: 59
End: 2024-07-09
Attending: FAMILY MEDICINE
Payer: MEDICARE

## 2024-07-09 ENCOUNTER — APPOINTMENT (OUTPATIENT)
Dept: CT IMAGING | Age: 59
End: 2024-07-09
Payer: MEDICARE

## 2024-07-09 LAB
ANION GAP SERPL CALCULATED.3IONS-SCNC: 12 MMOL/L (ref 3–16)
BASOPHILS # BLD: 0 K/UL (ref 0–0.2)
BASOPHILS NFR BLD: 0.1 %
BUN SERPL-MCNC: 38 MG/DL (ref 7–20)
CALCIUM SERPL-MCNC: 9.3 MG/DL (ref 8.3–10.6)
CHLORIDE SERPL-SCNC: 112 MMOL/L (ref 99–110)
CHOLEST SERPL-MCNC: 88 MG/DL (ref 0–199)
CK SERPL-CCNC: 246 U/L (ref 39–308)
CO2 SERPL-SCNC: 19 MMOL/L (ref 21–32)
CREAT SERPL-MCNC: 0.6 MG/DL (ref 0.9–1.3)
DEPRECATED RDW RBC AUTO: 16.4 % (ref 12.4–15.4)
ECHO AO ROOT DIAM: 3.5 CM
ECHO AO ROOT INDEX: 1.5 CM/M2
ECHO AV AREA PEAK VELOCITY: 2.3 CM2
ECHO AV AREA VTI: 2.2 CM2
ECHO AV AREA/BSA PEAK VELOCITY: 1 CM2/M2
ECHO AV AREA/BSA VTI: 0.9 CM2/M2
ECHO AV MEAN GRADIENT: 7 MMHG
ECHO AV MEAN VELOCITY: 1.3 M/S
ECHO AV PEAK GRADIENT: 14 MMHG
ECHO AV PEAK VELOCITY: 1.9 M/S
ECHO AV VELOCITY RATIO: 0.68
ECHO AV VTI: 33.1 CM
ECHO BSA: 2.45 M2
ECHO IVC EXP: 2.1 CM
ECHO LA AREA 2C: 18.8 CM2
ECHO LA AREA 4C: 19.5 CM2
ECHO LA MAJOR AXIS: 4.9 CM
ECHO LA MINOR AXIS: 4.9 CM
ECHO LA VOL BP: 54 ML (ref 18–58)
ECHO LA VOL MOD A2C: 56 ML (ref 18–58)
ECHO LA VOL MOD A4C: 54 ML (ref 18–58)
ECHO LA VOL/BSA BIPLANE: 23 ML/M2 (ref 16–34)
ECHO LA VOLUME INDEX MOD A2C: 24 ML/M2 (ref 16–34)
ECHO LA VOLUME INDEX MOD A4C: 23 ML/M2 (ref 16–34)
ECHO LV E' LATERAL VELOCITY: 13 CM/S
ECHO LV E' SEPTAL VELOCITY: 9 CM/S
ECHO LV FRACTIONAL SHORTENING: 36 % (ref 28–44)
ECHO LV INTERNAL DIMENSION DIASTOLE INDEX: 1.88 CM/M2
ECHO LV INTERNAL DIMENSION DIASTOLIC: 4.4 CM (ref 4.2–5.9)
ECHO LV INTERNAL DIMENSION SYSTOLIC INDEX: 1.2 CM/M2
ECHO LV INTERNAL DIMENSION SYSTOLIC: 2.8 CM
ECHO LV IVSD: 0.9 CM (ref 0.6–1)
ECHO LV MASS 2D: 128 G (ref 88–224)
ECHO LV MASS INDEX 2D: 54.7 G/M2 (ref 49–115)
ECHO LV POSTERIOR WALL DIASTOLIC: 0.9 CM (ref 0.6–1)
ECHO LV RELATIVE WALL THICKNESS RATIO: 0.41
ECHO LVOT AREA: 3.5 CM2
ECHO LVOT AV VTI INDEX: 0.67
ECHO LVOT DIAM: 2.1 CM
ECHO LVOT MEAN GRADIENT: 4 MMHG
ECHO LVOT PEAK GRADIENT: 7 MMHG
ECHO LVOT PEAK VELOCITY: 1.3 M/S
ECHO LVOT STROKE VOLUME INDEX: 32.7 ML/M2
ECHO LVOT SV: 76.5 ML
ECHO LVOT VTI: 22.1 CM
ECHO MV A VELOCITY: 0.96 M/S
ECHO MV AREA VTI: 3.3 CM2
ECHO MV E DECELERATION TIME (DT): 252 MS
ECHO MV E VELOCITY: 0.82 M/S
ECHO MV E/A RATIO: 0.85
ECHO MV E/E' LATERAL: 6.31
ECHO MV E/E' RATIO (AVERAGED): 7.71
ECHO MV E/E' SEPTAL: 9.11
ECHO MV LVOT VTI INDEX: 1.06
ECHO MV MAX VELOCITY: 1 M/S
ECHO MV MEAN GRADIENT: 2 MMHG
ECHO MV MEAN VELOCITY: 0.7 M/S
ECHO MV PEAK GRADIENT: 4 MMHG
ECHO MV VTI: 23.5 CM
ECHO PV MAX VELOCITY: 1 M/S
ECHO PV PEAK GRADIENT: 4 MMHG
ECHO RA AREA 4C: 16.6 CM2
ECHO RA END SYSTOLIC VOLUME APICAL 4 CHAMBER INDEX BSA: 20 ML/M2
ECHO RA VOLUME: 47 ML
ECHO RV BASAL DIMENSION: 5 CM
ECHO RV FREE WALL PEAK S': 19 CM/S
ECHO RV MID DIMENSION: 3.8 CM
ECHO RV TAPSE: 2.9 CM (ref 1.7–?)
EKG ATRIAL RATE: 113 BPM
EKG DIAGNOSIS: NORMAL
EKG P AXIS: 76 DEGREES
EKG P-R INTERVAL: 160 MS
EKG Q-T INTERVAL: 322 MS
EKG QRS DURATION: 88 MS
EKG QTC CALCULATION (BAZETT): 441 MS
EKG R AXIS: 75 DEGREES
EKG T AXIS: -15 DEGREES
EKG VENTRICULAR RATE: 113 BPM
EOSINOPHIL # BLD: 0 K/UL (ref 0–0.6)
EOSINOPHIL NFR BLD: 0.1 %
GFR SERPLBLD CREATININE-BSD FMLA CKD-EPI: >90 ML/MIN/{1.73_M2}
GLUCOSE BLD-MCNC: 90 MG/DL (ref 70–99)
GLUCOSE SERPL-MCNC: 120 MG/DL (ref 70–99)
HCT VFR BLD AUTO: 26.8 % (ref 40.5–52.5)
HDLC SERPL-MCNC: 39 MG/DL (ref 40–60)
HGB BLD-MCNC: 8.6 G/DL (ref 13.5–17.5)
IRON SATN MFR SERPL: 12 % (ref 20–50)
IRON SERPL-MCNC: 23 UG/DL (ref 59–158)
LDLC SERPL CALC-MCNC: 32 MG/DL
LYMPHOCYTES # BLD: 1.5 K/UL (ref 1–5.1)
LYMPHOCYTES NFR BLD: 12 %
MAGNESIUM SERPL-MCNC: 1.6 MG/DL (ref 1.8–2.4)
MCH RBC QN AUTO: 29 PG (ref 26–34)
MCHC RBC AUTO-ENTMCNC: 32.1 G/DL (ref 31–36)
MCV RBC AUTO: 90.3 FL (ref 80–100)
MONOCYTES # BLD: 1.4 K/UL (ref 0–1.3)
MONOCYTES NFR BLD: 11.2 %
NEUTROPHILS # BLD: 9.4 K/UL (ref 1.7–7.7)
NEUTROPHILS NFR BLD: 76.6 %
OSMOLALITY SERPL: 314 MOSM/KG (ref 275–295)
PERFORMED ON: NORMAL
PLATELET # BLD AUTO: 344 K/UL (ref 135–450)
PMV BLD AUTO: 7 FL (ref 5–10.5)
POTASSIUM SERPL-SCNC: 3.3 MMOL/L (ref 3.5–5.1)
RBC # BLD AUTO: 2.96 M/UL (ref 4.2–5.9)
SODIUM SERPL-SCNC: 143 MMOL/L (ref 136–145)
TIBC SERPL-MCNC: 187 UG/DL (ref 260–445)
TRIGL SERPL-MCNC: 83 MG/DL (ref 0–150)
VLDLC SERPL CALC-MCNC: 17 MG/DL
WBC # BLD AUTO: 12.3 K/UL (ref 4–11)

## 2024-07-09 PROCEDURE — 6370000000 HC RX 637 (ALT 250 FOR IP): Performed by: FAMILY MEDICINE

## 2024-07-09 PROCEDURE — 2700000000 HC OXYGEN THERAPY PER DAY

## 2024-07-09 PROCEDURE — 93306 TTE W/DOPPLER COMPLETE: CPT | Performed by: INTERNAL MEDICINE

## 2024-07-09 PROCEDURE — 83930 ASSAY OF BLOOD OSMOLALITY: CPT

## 2024-07-09 PROCEDURE — 36415 COLL VENOUS BLD VENIPUNCTURE: CPT

## 2024-07-09 PROCEDURE — 51702 INSERT TEMP BLADDER CATH: CPT

## 2024-07-09 PROCEDURE — 6360000002 HC RX W HCPCS: Performed by: FAMILY MEDICINE

## 2024-07-09 PROCEDURE — 80061 LIPID PANEL: CPT

## 2024-07-09 PROCEDURE — 93306 TTE W/DOPPLER COMPLETE: CPT

## 2024-07-09 PROCEDURE — 99222 1ST HOSP IP/OBS MODERATE 55: CPT | Performed by: INTERNAL MEDICINE

## 2024-07-09 PROCEDURE — 82550 ASSAY OF CK (CPK): CPT

## 2024-07-09 PROCEDURE — 6360000002 HC RX W HCPCS: Performed by: STUDENT IN AN ORGANIZED HEALTH CARE EDUCATION/TRAINING PROGRAM

## 2024-07-09 PROCEDURE — 83550 IRON BINDING TEST: CPT

## 2024-07-09 PROCEDURE — 80048 BASIC METABOLIC PNL TOTAL CA: CPT

## 2024-07-09 PROCEDURE — 2580000003 HC RX 258: Performed by: FAMILY MEDICINE

## 2024-07-09 PROCEDURE — 83735 ASSAY OF MAGNESIUM: CPT

## 2024-07-09 PROCEDURE — 6360000004 HC RX CONTRAST MEDICATION: Performed by: INTERNAL MEDICINE

## 2024-07-09 PROCEDURE — 85025 COMPLETE CBC W/AUTO DIFF WBC: CPT

## 2024-07-09 PROCEDURE — 6370000000 HC RX 637 (ALT 250 FOR IP): Performed by: STUDENT IN AN ORGANIZED HEALTH CARE EDUCATION/TRAINING PROGRAM

## 2024-07-09 PROCEDURE — 83540 ASSAY OF IRON: CPT

## 2024-07-09 PROCEDURE — 1200000000 HC SEMI PRIVATE

## 2024-07-09 PROCEDURE — 71260 CT THORAX DX C+: CPT

## 2024-07-09 PROCEDURE — 93010 ELECTROCARDIOGRAM REPORT: CPT | Performed by: INTERNAL MEDICINE

## 2024-07-09 PROCEDURE — 94760 N-INVAS EAR/PLS OXIMETRY 1: CPT

## 2024-07-09 PROCEDURE — 3E043XZ INTRODUCTION OF VASOPRESSOR INTO CENTRAL VEIN, PERCUTANEOUS APPROACH: ICD-10-PCS | Performed by: FAMILY MEDICINE

## 2024-07-09 RX ORDER — SODIUM CHLORIDE 0.9 % (FLUSH) 0.9 %
5-40 SYRINGE (ML) INJECTION EVERY 12 HOURS SCHEDULED
Status: DISCONTINUED | OUTPATIENT
Start: 2024-07-09 | End: 2024-07-11 | Stop reason: HOSPADM

## 2024-07-09 RX ORDER — LISINOPRIL 5 MG/1
5 TABLET ORAL DAILY
Status: DISCONTINUED | OUTPATIENT
Start: 2024-07-09 | End: 2024-07-11 | Stop reason: HOSPADM

## 2024-07-09 RX ORDER — FUROSEMIDE 10 MG/ML
40 INJECTION INTRAMUSCULAR; INTRAVENOUS 2 TIMES DAILY
Status: DISCONTINUED | OUTPATIENT
Start: 2024-07-09 | End: 2024-07-11 | Stop reason: HOSPADM

## 2024-07-09 RX ORDER — POLYETHYLENE GLYCOL 3350 17 G/17G
17 POWDER, FOR SOLUTION ORAL DAILY PRN
Status: DISCONTINUED | OUTPATIENT
Start: 2024-07-09 | End: 2024-07-11 | Stop reason: HOSPADM

## 2024-07-09 RX ORDER — ONDANSETRON 2 MG/ML
4 INJECTION INTRAMUSCULAR; INTRAVENOUS EVERY 6 HOURS PRN
Status: DISCONTINUED | OUTPATIENT
Start: 2024-07-09 | End: 2024-07-11 | Stop reason: HOSPADM

## 2024-07-09 RX ORDER — MAGNESIUM SULFATE IN WATER 40 MG/ML
4000 INJECTION, SOLUTION INTRAVENOUS ONCE
Status: COMPLETED | OUTPATIENT
Start: 2024-07-09 | End: 2024-07-09

## 2024-07-09 RX ORDER — SODIUM CHLORIDE 9 MG/ML
INJECTION, SOLUTION INTRAVENOUS PRN
Status: DISCONTINUED | OUTPATIENT
Start: 2024-07-09 | End: 2024-07-11 | Stop reason: HOSPADM

## 2024-07-09 RX ORDER — ACETAMINOPHEN 650 MG/1
650 SUPPOSITORY RECTAL EVERY 6 HOURS PRN
Status: DISCONTINUED | OUTPATIENT
Start: 2024-07-09 | End: 2024-07-11 | Stop reason: HOSPADM

## 2024-07-09 RX ORDER — CARVEDILOL 3.12 MG/1
3.12 TABLET ORAL 2 TIMES DAILY WITH MEALS
Status: DISCONTINUED | OUTPATIENT
Start: 2024-07-09 | End: 2024-07-11 | Stop reason: HOSPADM

## 2024-07-09 RX ORDER — POTASSIUM CHLORIDE 7.45 MG/ML
10 INJECTION INTRAVENOUS PRN
Status: DISCONTINUED | OUTPATIENT
Start: 2024-07-09 | End: 2024-07-11 | Stop reason: HOSPADM

## 2024-07-09 RX ORDER — ENOXAPARIN SODIUM 100 MG/ML
30 INJECTION SUBCUTANEOUS 2 TIMES DAILY
Status: DISCONTINUED | OUTPATIENT
Start: 2024-07-09 | End: 2024-07-11 | Stop reason: HOSPADM

## 2024-07-09 RX ORDER — ONDANSETRON 4 MG/1
4 TABLET, ORALLY DISINTEGRATING ORAL EVERY 8 HOURS PRN
Status: DISCONTINUED | OUTPATIENT
Start: 2024-07-09 | End: 2024-07-11 | Stop reason: HOSPADM

## 2024-07-09 RX ORDER — ACETAMINOPHEN 325 MG/1
650 TABLET ORAL EVERY 6 HOURS PRN
Status: DISCONTINUED | OUTPATIENT
Start: 2024-07-09 | End: 2024-07-11 | Stop reason: HOSPADM

## 2024-07-09 RX ORDER — MINERAL OIL/HYDROPHIL PETROLAT
OINTMENT (GRAM) TOPICAL 3 TIMES DAILY PRN
Status: DISCONTINUED | OUTPATIENT
Start: 2024-07-09 | End: 2024-07-11 | Stop reason: HOSPADM

## 2024-07-09 RX ORDER — MAGNESIUM SULFATE IN WATER 40 MG/ML
2000 INJECTION, SOLUTION INTRAVENOUS PRN
Status: DISCONTINUED | OUTPATIENT
Start: 2024-07-09 | End: 2024-07-11 | Stop reason: HOSPADM

## 2024-07-09 RX ORDER — SODIUM CHLORIDE 0.9 % (FLUSH) 0.9 %
5-40 SYRINGE (ML) INJECTION PRN
Status: DISCONTINUED | OUTPATIENT
Start: 2024-07-09 | End: 2024-07-11 | Stop reason: HOSPADM

## 2024-07-09 RX ORDER — POTASSIUM CHLORIDE 20 MEQ/1
40 TABLET, EXTENDED RELEASE ORAL PRN
Status: DISCONTINUED | OUTPATIENT
Start: 2024-07-09 | End: 2024-07-11 | Stop reason: HOSPADM

## 2024-07-09 RX ADMIN — ENOXAPARIN SODIUM 30 MG: 100 INJECTION SUBCUTANEOUS at 01:50

## 2024-07-09 RX ADMIN — FUROSEMIDE 40 MG: 10 INJECTION, SOLUTION INTRAMUSCULAR; INTRAVENOUS at 18:16

## 2024-07-09 RX ADMIN — HYDROMORPHONE HYDROCHLORIDE 1 MG: 1 INJECTION, SOLUTION INTRAMUSCULAR; INTRAVENOUS; SUBCUTANEOUS at 16:03

## 2024-07-09 RX ADMIN — ONDANSETRON 4 MG: 2 INJECTION INTRAMUSCULAR; INTRAVENOUS at 08:22

## 2024-07-09 RX ADMIN — POTASSIUM CHLORIDE 40 MEQ: 1500 TABLET, EXTENDED RELEASE ORAL at 06:29

## 2024-07-09 RX ADMIN — HYDROMORPHONE HYDROCHLORIDE 1 MG: 1 INJECTION, SOLUTION INTRAMUSCULAR; INTRAVENOUS; SUBCUTANEOUS at 11:56

## 2024-07-09 RX ADMIN — MAGNESIUM SULFATE HEPTAHYDRATE 4000 MG: 40 INJECTION, SOLUTION INTRAVENOUS at 12:03

## 2024-07-09 RX ADMIN — ENOXAPARIN SODIUM 30 MG: 100 INJECTION SUBCUTANEOUS at 08:22

## 2024-07-09 RX ADMIN — HYDROMORPHONE HYDROCHLORIDE 1 MG: 1 INJECTION, SOLUTION INTRAMUSCULAR; INTRAVENOUS; SUBCUTANEOUS at 20:09

## 2024-07-09 RX ADMIN — FUROSEMIDE 40 MG: 10 INJECTION, SOLUTION INTRAMUSCULAR; INTRAVENOUS at 08:22

## 2024-07-09 RX ADMIN — SODIUM CHLORIDE, PRESERVATIVE FREE 10 ML: 5 INJECTION INTRAVENOUS at 20:16

## 2024-07-09 RX ADMIN — ENOXAPARIN SODIUM 30 MG: 100 INJECTION SUBCUTANEOUS at 20:09

## 2024-07-09 RX ADMIN — IOPAMIDOL 75 ML: 755 INJECTION, SOLUTION INTRAVENOUS at 16:45

## 2024-07-09 RX ADMIN — SODIUM CHLORIDE, PRESERVATIVE FREE 10 ML: 5 INJECTION INTRAVENOUS at 08:22

## 2024-07-09 RX ADMIN — Medication: at 18:16

## 2024-07-09 RX ADMIN — ONDANSETRON 4 MG: 2 INJECTION INTRAMUSCULAR; INTRAVENOUS at 14:50

## 2024-07-09 ASSESSMENT — PAIN DESCRIPTION - DESCRIPTORS: DESCRIPTORS: BURNING;STABBING

## 2024-07-09 ASSESSMENT — PAIN SCALES - GENERAL
PAINLEVEL_OUTOF10: 6
PAINLEVEL_OUTOF10: 9
PAINLEVEL_OUTOF10: 8
PAINLEVEL_OUTOF10: 9
PAINLEVEL_OUTOF10: 6

## 2024-07-09 ASSESSMENT — PAIN DESCRIPTION - LOCATION
LOCATION: LEG
LOCATION: LEG

## 2024-07-09 ASSESSMENT — PAIN DESCRIPTION - ORIENTATION: ORIENTATION: LEFT;RIGHT

## 2024-07-09 NOTE — CONSULTS
Department of Podiatry Consult Note  Ramin Panchal DPM Attending       Reason for Consult:  Wounds to both lower extremities, maggots  Requesting Physician:  Ruth Powers MD    CHIEF COMPLAINT:  Wounds to both lower extremities with infection and infestation of maggots    HISTORY OF PRESENT ILLNESS:                The patient is a 59 y.o. male with significant past medical history of COPD, Anxiety about health care who is consulted for lower extremity wounds of both feet, calves and knees. Patient was found on the floor of his home by his out of state daughter. Daughter relates he has been crawling around his home on hands and knees as he cannot walk to due pain of lower extremities. Excoriations of feet, knees and elbows is due to friction of dragging. Daughter relates she found a suicide note and gun in the room where he was found. Initial interview with patient was short due to transport to [place] for imaging    Past Medical History:        Diagnosis Date    COPD (chronic obstructive pulmonary disease) ([facility])      Past Surgical History:        Procedure Laterality Date    EYE SURGERY Left 1968    weak eye muscle     Current Medications:    Current Facility-Administered Medications: mineral oil-hydrophilic petrolatum (AQUAPHOR) ointment, , Topical, TID PRN  perflutren lipid microspheres (DEFINITY) injection 1.5 mL, 1.5 mL, IntraVENous, ONCE PRN  furosemide (LASIX) injection 40 mg, 40 mg, IntraVENous, BID  sodium chloride flush 0.9 % injection 5-40 mL, 5-40 mL, IntraVENous, 2 times per day  sodium chloride flush 0.9 % injection 5-40 mL, 5-40 mL, IntraVENous, PRN  0.9 % sodium chloride infusion, , IntraVENous, PRN  ondansetron (ZOFRAN-ODT) disintegrating tablet 4 mg, 4 mg, Oral, Q8H PRN **OR** ondansetron (ZOFRAN) injection 4 mg, 4 mg, IntraVENous, Q6H PRN  polyethylene glycol (GLYCOLAX) packet 17 g, 17 g, Oral, Daily PRN  acetaminophen (TYLENOL) tablet 650 mg, 650 mg, Oral, Q6H PRN **OR** acetaminophen (TYLENOL)

## 2024-07-09 NOTE — DISCHARGE INSTR - COC
Continuity of Care Form    Patient Name: Hamlet Mcgrath   :  1965  MRN:  [id number]    Admit date:  2024  Discharge date:  ***    Code Status Order: Full Code   Advance Directives:     Admitting Physician:  Aldo Benson MD  PCP: No primary care provider on file.    Discharging Nurse: ***  Discharging Hospital Unit/Room#: S3C-3832/4276-01  Discharging Unit Phone Number: ***    Emergency Contact:   Extended Emergency Contact Information  Primary Emergency Contact: Tamanna Younger  Home Phone: [phone]  Relation: Child  Secondary Emergency Contact: Bety Charlton  Home Phone: [phone]  Relation: Child   needed? No    Past Surgical History:  Past Surgical History:   Procedure Laterality Date    EYE SURGERY Left 1968    weak eye muscle       Immunization History:   Immunization History   Administered Date(s) Administered    Influenza Vaccine, unspecified formulation 10/13/2009    Influenza, FLUBLOK, (age 18 y+), PF, 0.5mL 2018    Pneumococcal, PPSV23, PNEUMOVAX 23, (age 2y+), SC/IM, 0.5mL 2018    TDaP, ADACEL (age 10y-64y), BOOSTRIX (age 10y+), IM, 0.5mL 2009       Active Problems:  Patient Active Problem List   Diagnosis Code    Bilateral lower extremity edema R60.0    Shortness of breath R06.02    COPD (chronic obstructive pulmonary disease) ([facility]) J44.9    Leg ulcer, right, limited to breakdown of skin ([facility]) L97.911    Leg swelling M79.89    Lymphedema of both lower extremities I89.0    Tobacco abuse Z72.0    Venous (peripheral) insufficiency I87.2    Morbid obesity ([facility]) E66.01    Dermatitis L30.9    Acute on chronic systolic CHF, NYHA class 2 and ACC/AHA stage C ([facility]) I50.23       Isolation/Infection:   Isolation            No Isolation          Patient Infection Status       None to display            Nurse Assessment:  Last Vital Signs: BP (!) 108/54   Pulse 86   Temp 97.5 °F (36.4 °C) (Oral)   Resp 17   Ht 1.702 m (5' 7\")   Wt 127.5 kg (281 lb 1.4 oz)    SpO2 95%   BMI 44.02 kg/m²     Last documented pain score (0-10 scale): Pain Level: 9  Last Weight:   Wt Readings from Last 1 Encounters:   07/09/24 127.5 kg (281 lb 1.4 oz)     Mental Status:  {IP PT MENTAL STATUS:00785}    IV Access:  { IONA IV ACCESS:665704126}    Nursing Mobility/ADLs:  Walking   {CHP DME ADLs:424486536}  Transfer  {CHP DME ADLs:491111635}  Bathing  {CHP DME ADLs:794026146}  Dressing  {CHP DME ADLs:503808182}  Toileting  {CHP DME ADLs:377927375}  Feeding  {CHP DME ADLs:721969307}  Med Admin  {P DME ADLs:313562639}  Med Delivery   { IONA MED Delivery:141187962}    Wound Care Documentation and Therapy:  Wound 07/09/24 Hip Left;Anterior Stage II- DTI (Active)   Wound Etiology Pressure Stage 2 07/09/24 0246   Dressing Status New dressing applied;Clean;Dry;Intact 07/09/24 0246   Wound Cleansed Irrigated with saline 07/09/24 0246   Dressing Change Due 07/12/24 07/09/24 0246   Wound Assessment Eschar dry 07/09/24 0246   Drainage Amount None (dry) 07/09/24 0246   Azucena-wound Assessment Blanchable erythema 07/09/24 0246   Number of days: 0       Wound 07/09/24 Elbow Left;Posterior Abrasion (Active)   Wound Etiology Skin Tear 07/09/24 0246   Dressing Status New dressing applied;Clean;Dry;Intact 07/09/24 0246   Wound Cleansed Cleansed with saline 07/09/24 0246   Dressing Change Due 07/12/24 07/09/24 0246   Wound Assessment Bleeding;Pink/red 07/09/24 0246   Drainage Amount Scant (moist but unmeasurable) 07/09/24 0246   Drainage Description Thin;Serosanguinous 07/09/24 0246   Odor None 07/09/24 0246   Azucena-wound Assessment Blanchable erythema 07/09/24 0246   Number of days: 0       Wound Elbow Posterior;Right Abrasion (Active)   Wound Etiology Skin Tear 07/09/24 0246   Dressing Status New dressing applied;Clean;Dry;Intact 07/09/24 0246   Wound Cleansed Cleansed with saline 07/09/24 0246   Dressing Change Due 07/12/24 07/09/24 0246   Wound Assessment Bleeding;Pink/red 07/09/24 0246   Drainage Amount Scant    Elimination:  Continence:   Bowel: {YES / NO:}  Bladder: {YES / NO:}  Urinary Catheter: {Urinary Catheter:588598880}   Colostomy/Ileostomy/Ileal Conduit: {YES / NO:}       Date of Last BM: ***    Intake/Output Summary (Last 24 hours) at 2024 1331  Last data filed at 2024 0557  Gross per 24 hour   Intake 100 ml   Output 550 ml   Net -450 ml     I/O last 3 completed shifts:  In: 100 [P.O.:100]  Out: 550 [Urine:550]    Safety Concerns:     { IONA Safety Concerns:478988740}    Impairments/Disabilities:      { IONA Impairments/Disabilities:999648601}    Nutrition Therapy:  Current Nutrition Therapy:   { IONA Diet List:126816692}    Routes of Feeding: {CHP DME Other Feedings:438962418}  Liquids: {Slp liquid thickness:05693}  Daily Fluid Restriction: {CHP DME Yes amt example:156928655}  Last Modified Barium Swallow with Video (Video Swallowing Test): {Done Not Done Date:}    Treatments at the Time of Hospital Discharge:   Respiratory Treatments: ***  Oxygen Therapy:  {Therapy; copd oxygen:86761}  Ventilator:    {[facility] Vent List:299583019}    Rehab Therapies: {THERAPEUTIC INTERVENTION:6412587819}  Weight Bearing Status/Restrictions: {[facility] Weight Bearin}  Other Medical Equipment (for information only, NOT a DME order):  {EQUIPMENT:704112677}  Other Treatments: ***    Heart Failure Instructions for Daily Management  Patient was treated for acute diastolic heart failure/ right sided heart failure/ cor pulmonale (RV dilatation)  he  will require the following:    Please weigh daily on the same scale and approximately the same time of day.  Report weight gain of 3 pounds/day or 5 pounds/week to : susan WARE and [facility] [phone].  Please use hospital discharge weight as baseline reference.   Please monitor for signs and symptoms of and report to MD:  Worsening Heart Failure: sudden weight gain, shortness of breath, lower extremity or general edema/swelling,

## 2024-07-09 NOTE — CARE COORDINATION
[facility] Continence Nurse  Follow-up Progress Note       NAME:  Hamlet Mcgrath  MEDICAL RECORD NUMBER:  [id number]  AGE:  59 y.o.   GENDER:  male  :  1965  TODAY'S DATE:  2024    Subjective:   Pt has been medicated per RN with IV Dilaudid.   Wound Identification:  Wound Type: pressure  Contributing Factors: chronic pressure        Patient Goal of Care:  [] Wound Healing  [] Odor Control  [] Palliative Care  [] Pain Control   [] Other:     Objective:    BP (!) 108/54   Pulse 86   Temp 97.5 °F (36.4 °C) (Oral)   Resp 17   Ht 1.702 m (5' 7\")   Wt 127.5 kg (281 lb)   SpO2 95%   BMI 44.01 kg/m²   Hansel Risk Score: Hansel Scale Score: 13  Assessment:   Measurements:  LLE-posterior aspect    Left hip    Right inner knee    Left thigh        Wound 24 Hip Left;Anterior Stage II- DTI (Active)   Wound Image   24 1215   Wound Etiology Pressure Unstageable 24 1215   Dressing Status Clean;Dry;Intact 24 1215   Wound Cleansed Not Cleansed 24 1124   Dressing/Treatment Foam 24 1124   Dressing Change Due 24 1215   Wound Length (cm) 7 cm 24 1215   Wound Width (cm) 1 cm 24 1215   Wound Depth (cm) 5 cm 24 1215   Wound Surface Area (cm^2) 7 cm^2 24 1215   Wound Volume (cm^3) 35 cm^3 24 1215   Wound Assessment Eschar dry 24 1215   Drainage Amount Scant (moist but unmeasurable) 24 1215   Drainage Description Serosanguinous 24 1215   Odor None 24 1215   Azucena-wound Assessment Blanchable erythema 24 1215   Margins Defined edges 24 1215   Number of days: 0       Wound 24 Elbow Left;Posterior Abrasion (Active)   Wound Etiology Skin Tear 24 1215   Dressing Status Clean;Dry;Intact 24 1215   Wound Cleansed Not Cleansed 24 1124   Dressing/Treatment Foam 24 1215   Dressing Change Due 24 1215   Wound Assessment Pink/red 24  hip    Specialty Bed Required : Yes   [] Low Air Loss   [x] Pressure Redistribution-declines for now  [] Fluid Immersion  [] Bariatric  [] Total Pressure Relief  [] Other:     Current Diet: ADULT DIET; Regular; No Added Salt (3-4 gm)  Dietician consult:  Yes    Discharge Plan:  Placement for patient upon discharge: TBD  Patient appropriate for Outpatient Wound Care Center: No    Referrals:  [x]   [] Home Health Care  [] Supplies  [] Other    Patient/Caregiver Teaching:  Level of patient/caregiver understanding able to:   [] Indicates understanding       [x] Needs reinforcement  [] Unsuccessful      [] Verbal Understanding  [] Demonstrated understanding       [] No evidence of learning  [] Refused teaching         [] N/A       Electronically signed by FAUSTO Lamb on 7/9/2024 at 3:13 PM

## 2024-07-09 NOTE — ED PROVIDER NOTES
[facility] EMERGENCY DEPARTMENT  EMERGENCY DEPARTMENT ENCOUNTER        Pt Name: Hamlet Mcgrath  MRN: [id number]  Birthdate 1965  Date of evaluation: 7/8/2024  Provider: Santy Monzon PA-C  PCP: No primary care provider on file.  Note Started: 8:17 PM EDT 7/8/24      LAURA. I have evaluated this patient.        CHIEF COMPLAINT       Chief Complaint   Patient presents with    Altered Mental Status     Pt was found down for unknown amount of time for daughter in [place].        HISTORY OF PRESENT ILLNESS: 1 or more Elements     History From: Patient and Daughters            Chief Complaint: Generalized weakness    Hamlet Mcgrath is a 59 y.o. male who presents and reportedly has a lot of ongoing dependent edema in the lower legs.  States that he knows he really has not been able to take care of himself for quite a while.  Reportedly patient and his daughters talk pretty frequently on the phone.  However reportedly he somehow got himself on the floor around Friday of last week and has not been able to get himself up therefore he has just been laying there.  Family came to the house today and he was willing to get some help and therefore they called 911 and had him brought to the ER.  Other than being achy all over and not having eaten in the last few days, patient without any particular acute complaint but he states that he knows he has sores on his lower legs that are not well cared for.    Nursing Notes were all reviewed and agreed with or any disagreements were addressed in the HPI.    REVIEW OF SYSTEMS :      Review of Systems  Positive history as above  Positives and Pertinent negatives as per HPI.     SURGICAL HISTORY     Past Surgical History:   Procedure Laterality Date    EYE SURGERY Left 1968    weak eye muscle       CURRENTMEDICATIONS       Previous Medications    AMLODIPINE (NORVASC) 5 MG TABLET    Take 1 tablet by mouth daily    EMOLLIENT (DERMAPHOR) OINT OINTMENT    Apply topically

## 2024-07-09 NOTE — CARE COORDINATION
family members/significant others, and if so, who? (P) No  Plans to Return to Present Housing: (P) Yes  Other Identified Issues/Barriers to RETURNING to current housing: lives alone, poor care with wounds  Potential Assistance needed at discharge: (P) N/A            Potential DME:    Patient expects to discharge to: (P) Apartment  Plan for transportation at discharge: (P) Self    Financial    Payor: MEDICARE / Plan: MEDICARE PART A AND B / Product Type: *No Product type* /     Does insurance require precert for SNF: No    Potential assistance Purchasing Medications: (P) No  Meds-to-Beds request: Yes      [id number] - [place], [place] - [address] - P [phone] - F [phone]  [address]  [facility] [address]  Phone: [phone] Fax: [phone]      Notes:    Factors facilitating achievement of predicted outcomes: Family support, Cooperative, and Pleasant    Barriers to discharge: Decreased endurance, Decreased sensation, and Lower extremity weakness    Additional Case Management Notes: Pt is from home alone, no DME use. Poor care with wounds, would benefit from wound care with SN, likely HC referral.  Podiatry to be consulted, wound care following. Need PT/OT, likely will need a walker.     The Plan for Transition of Care is related to the following treatment goals of Hypoalbuminemia [E88.09]  Elevated BUN [R79.9]  Elevated CK [R74.8]  Bilateral leg edema [R60.0]  Unable to ambulate [R26.2]  Acute on chronic systolic CHF, NYHA class 2 and ACC/AHA stage C (HCC) [I50.23]  Acute on chronic congestive heart failure, unspecified heart failure type (HCC) [I50.9]    IF APPLICABLE: The Patient and/or patient representative Hamlet and his family were provided with a choice of provider and agrees with the discharge plan. Freedom of choice list with basic dialogue that supports the patient's individualized plan of care/goals and shares the quality data associated with the providers was

## 2024-07-09 NOTE — FLOWSHEET NOTE
Received pt. From ED, wheeled-in via stretcher c/o ED Staff. A&O x4, ushered to bed, oriented to room. Call light placed within reached and instructed to use when needing assistance.    Routine admission assessment done.

## 2024-07-09 NOTE — FLOWSHEET NOTE
4 Eyes Skin Assessment     NAME:  Hamlet Mcgrath  YOB: 1965  MEDICAL RECORD NUMBER:  [id number]    The patient is being assessed for  Admission    I agree that at least one RN has performed a thorough Head to Toe Skin Assessment on the patient. ALL assessment sites listed below have been assessed.      Areas assessed by both nurses:    Head, Face, Ears, Shoulders, Back, Chest, Arms, Elbows, Hands, Sacrum. Buttock, Coccyx, Ischium, Legs. Feet and Heels, and Under Medical Devices         Does the Patient have a Wound? Yes wound(s) were present on assessment. LDA wound assessment was Initiated and completed by RN       Hansel Prevention initiated by RN: Yes  Wound Care Orders initiated by RN: Yes    Pressure Injury (Stage 3,4, Unstageable, DTI, NWPT, and Complex wounds) if present, place Wound referral order by RN under : Yes    New Ostomies, if present place, Ostomy referral order under : No     Nurse 1 eSignature: Electronically signed by Mariela Simons RN on 7/9/24 at 2:13 AM EDT    **SHARE this note so that the co-signing nurse can place an eSignature**    Nurse 2 eSignature: Electronically signed by Alexa Crystal RN on 7/9/24 at 2:25 AM EDT

## 2024-07-09 NOTE — PROGRESS NOTES
Occupational Therapy Attempt  Hamlet Mcgrath    OT order noted. Pt off floor at time of attempt for an Echo. RN reports pt is in significant pain today and would be best to re-attempt next date. Will follow up.    Electronically signed by Shyla Byrne OT on 7/9/24 at 2:24 PM EDT

## 2024-07-09 NOTE — PROGRESS NOTES
Physical Therapy  Eval Attempt    24    Name: Hamlet Mcgrath   : 1965    MRN: [id number]    Physical therapy orders acknowledged. Pt unable to be assessed this afternoon as patient is off of floor for Echo procedure. RN also reporting that he is in significant pain.    PT will continue to monitor and follow up as schedule allows.    Electronically signed by Link Sebastian PT on 2024 at 2:25 PM

## 2024-07-09 NOTE — DISCHARGE INSTRUCTIONS
Extra Heart Failure Education/ Tools/ Resources:     https://Remerge.com/publication/?w=280486   --- this is American Heart Association interactive Healthier Living with Heart Failure guidebook.  Please click hyperlink or copy / paste link into search bar. The QR Code is also available below. Use your mouse to scroll through the pages.  Lots of information about weight monitoring, diet tips, activity, meds, etc    Heart Failure Tools and Resources QR Code is below. It includes multiple resources to include symptom tracker, med tracker, further HF info, and access to a HF Support Network online Community    HF [place] Jarret  -- this is a free smart phone jarret available for iPhone and Android download.  Use your phone to track sodium / fluid intake, zone tool symptom tracking, weights, medications, etc. Click on this hyperlink  HF [place] Jarret   for QR code for easy download or the link is also found in the below HF Tools and Resources.      DASH (Dietary Approach to Stop Hypertension) diet --  https://www.nhlbi.nih.gov/education/dash-eating-plan -- this diet is a flexible eating plan that promotes heart healthy eating style.  Click on hyperlink or copy / paste link into search bar.  Lots of low sodium recipes and tips.    https://www.Happigo.com.EDUonGo/recipes  -- more free recipes

## 2024-07-09 NOTE — PROGRESS NOTES
V2.0  [facility] [place]      Name:  Hamlet Mcgrath /Age/Sex: 1965  (59 y.o. male)   MRN & CSN:  [id number] & [id number] Encounter Date/Time: 2024 11:42 AM EDT    Location:  U5Y-5776/4276-01 PCP: No primary care provider on file.       Hospital Day: 2    Assessment and Plan:   Hamlet Mcgrath is a 59 y.o. male with pmh of COPD HF lymphedema who presents with Acute on chronic systolic CHF, NYHA class 2 and ACC/AHA stage C (HCC)      Plan:  Acute decompensated heart failure: last echocardiogram in 2017 with normal EF, pt with poor hygiene not taking any medications for unknown duration, elevated proBNP check echocardiogram, cardiology consult on coreg lisnipril for now along with 40 mg IV lasix  Severe lymphedema: with b/l LE wounds, consult podiatry wound care and PT/OT  Mild rhabdomyolysis: improved in AM patient was found on floor for unknown duration  Metabolic acidosis: likely 2/2 diarrhea, improving in AM continue to monitor  Physical deconditioning: consult pT/OT and     Diet ADULT DIET; Regular; No Added Salt (3-4 gm)   DVT Prophylaxis [] Lovenox, []  Heparin, [] SCDs, [] Ambulation,  [] Eliquis, [] Xarelto  [] Coumadin   Code Status Full Code   Disposition From: Home  Expected Disposition: SNF  Estimated Date of Discharge: 3 days  Patient requires continued admission due to CHF, physical deconditioning, podiatry consult   Surrogate Decision Maker/ POA      Subjective:     Chief Complaint: Altered Mental Status (Pt was found down for unknown amount of time for daughter in [place]. )       Hamlet Mcgrath is a 59 y.o. male who presents after he was found on floor for unknown duration of time seen and examined at bedside very sleepy as he did not sleep last night, most of the hisotry obtained from daughters at bedside they said he has not been taking care of himself atlleast for last few months does not lori any meds drinks alcohol not sure how much and smokes ciggarettes  foundConfirmed by RICCI BELL MD ([id number]) on 7/9/2024 8:24:35 AM   Blood Gas, Venous    Collection Time: 07/08/24  7:36 PM   Result Value Ref Range    pH, Neptali 7.276 (L) 7.350 - 7.450    pCO2, Neptali 40.0 40.0 - 50.0 mmHg    PO2, Neptali 37 Not Established mmHg    HCO3, Venous 19 (L) 23 - 29 mmol/L    Base Excess, Neptali -7.6 Not Established mmol/L    O2 Sat, Neptali 63 Not Established %    Carboxyhemoglobin 1.6 %    MetHgb, Neptali 1.0 <1.5 %    TC02 (Calc), Neptali 20 Not Established mmol/L    O2 Therapy Unknown    POCT Glucose    Collection Time: 07/08/24  7:38 PM   Result Value Ref Range    POC Glucose 109 (H) 70 - 99 mg/dl    Performed on ACCU-CHEK    POCT Glucose    Collection Time: 07/09/24  1:26 AM   Result Value Ref Range    POC Glucose 90 70 - 99 mg/dl    Performed on ACCU-CHEK    CBC with Auto Differential    Collection Time: 07/09/24  5:05 AM   Result Value Ref Range    WBC 12.3 (H) 4.0 - 11.0 K/uL    RBC 2.96 (L) 4.20 - 5.90 M/uL    Hemoglobin 8.6 (L) 13.5 - 17.5 g/dL    Hematocrit 26.8 (L) 40.5 - 52.5 %    MCV 90.3 80.0 - 100.0 fL    MCH 29.0 26.0 - 34.0 pg    MCHC 32.1 31.0 - 36.0 g/dL    RDW 16.4 (H) 12.4 - 15.4 %    Platelets 344 135 - 450 K/uL    MPV 7.0 5.0 - 10.5 fL    Neutrophils % 76.6 %    Lymphocytes % 12.0 %    Monocytes % 11.2 %    Eosinophils % 0.1 %    Basophils % 0.1 %    Neutrophils Absolute 9.4 (H) 1.7 - 7.7 K/uL    Lymphocytes Absolute 1.5 1.0 - 5.1 K/uL    Monocytes Absolute 1.4 (H) 0.0 - 1.3 K/uL    Eosinophils Absolute 0.0 0.0 - 0.6 K/uL    Basophils Absolute 0.0 0.0 - 0.2 K/uL   Basic Metabolic Panel    Collection Time: 07/09/24  5:05 AM   Result Value Ref Range    Sodium 143 136 - 145 mmol/L    Potassium 3.3 (L) 3.5 - 5.1 mmol/L    Chloride 112 (H) 99 - 110 mmol/L    CO2 19 (L) 21 - 32 mmol/L    Anion Gap 12 3 - 16    Glucose 120 (H) 70 - 99 mg/dL    BUN 38 (H) 7 - 20 mg/dL    Creatinine 0.6 (L) 0.9 - 1.3 mg/dL    Est, Glom Filt Rate >90 >60    Calcium 9.3 8.3 - 10.6 mg/dL   Magnesium    Collection  Both arterial and venous

## 2024-07-09 NOTE — PROGRESS NOTES
Medication Reconciliation    List of medications patient is currently taking is complete.     Source of information: 1. Conversation with patient and family at bedside                                      2. EPIC records      Notes regarding home medications:   1. Patient has not taken any prescription medications for years.

## 2024-07-09 NOTE — PROGRESS NOTES
Pt initially refusing to transport downstairs for CT.  Pt in 10/10 pain and doesn't want to be moved.  Will contact CT and try again after next dose of pain medicine

## 2024-07-09 NOTE — PLAN OF CARE
Problem: Discharge Planning  Goal: Discharge to home or other facility with appropriate resources  Outcome: Progressing     Problem: Pain  Goal: Verbalizes/displays adequate comfort level or baseline comfort level  Outcome: Progressing     Problem: Skin/Tissue Integrity  Goal: Absence of new skin breakdown  Description: 1.  Monitor for areas of redness and/or skin breakdown  2.  Assess vascular access sites hourly  3.  Every 4-6 hours minimum:  Change oxygen saturation probe site  4.  Every 4-6 hours:  If on nasal continuous positive airway pressure, respiratory therapy assess nares and determine need for appliance change or resting period.  Outcome: Progressing     Problem: Safety - Adult  Goal: Free from fall injury  Outcome: Progressing     Problem: Respiratory - Adult  Goal: Achieves optimal ventilation and oxygenation  Outcome: Progressing     Problem: Cardiovascular - Adult  Goal: Maintains optimal cardiac output and hemodynamic stability  Outcome: Progressing  Goal: Absence of cardiac dysrhythmias or at baseline  Outcome: Progressing     Problem: Skin/Tissue Integrity - Adult  Goal: Skin integrity remains intact  Outcome: Progressing  Goal: Oral mucous membranes remain intact  Outcome: Progressing     Problem: Musculoskeletal - Adult  Goal: Return mobility to safest level of function  Outcome: Progressing  Goal: Return ADL status to a safe level of function  Outcome: Progressing     Problem: Genitourinary - Adult  Goal: Absence of urinary retention  Outcome: Progressing  Goal: Urinary catheter remains patent  Outcome: Progressing     Problem: Metabolic/Fluid and Electrolytes - Adult  Goal: Electrolytes maintained within normal limits  Outcome: Progressing     Problem: Chronic Conditions and Co-morbidities  Goal: Patient's chronic conditions and co-morbidity symptoms are monitored and maintained or improved  Outcome: Progressing

## 2024-07-09 NOTE — CARE COORDINATION
[facility] Nurse  Consult Note       NAME:  Hamlet Mcgrath  MEDICAL RECORD NUMBER:  [id number]  AGE: 59 y.o.   GENDER: male  : 1965  TODAY'S DATE:  2024    Subjective   Reason for WOCN Evaluation and Assessment: multiple wounds, BLE wounds. Unable to complete assessment due to pain and nausea. RN to get orders for pan med and podiatry consult.      Hamlet Mcgrath is a 59 y.o. male referred by:   [] Physician  [x] Nursing  [] Other:     Wound Identification:  Wound Type: venous and pressure  Contributing Factors: venous stasis, lymphedema, and fall, pt was on floor unable to get up    Wound History: chronic lymphedema wounds  Current Wound Care Treatment:  compression stockings    Patient Goal of Care:  [x] Wound Healing  [] Odor Control  [] Palliative Care  [x] Pain Control   [] Other:         PAST MEDICAL HISTORY        Diagnosis Date    COPD (chronic obstructive pulmonary disease) (HCC)        PAST SURGICAL HISTORY    Past Surgical History:   Procedure Laterality Date    EYE SURGERY Left 1968    weak eye muscle       FAMILY HISTORY    Family History   Problem Relation Age of Onset    Other Mother         liver from drinking    Heart Disease Father        SOCIAL HISTORY    Social History     Tobacco Use    Smoking status: Every Day     Current packs/day: 1.00     Types: Cigarettes    Smokeless tobacco: Current   Vaping Use    Vaping Use: Never used   Substance Use Topics    Alcohol use: Yes     Alcohol/week: 12.0 standard drinks of alcohol     Types: 12 Cans of beer per week     Comment: quit liquor about 6 months ago    Drug use: No       ALLERGIES    No Known Allergies    MEDICATIONS    No current facility-administered medications on file prior to encounter.     Current Outpatient Medications on File Prior to Encounter   Medication Sig Dispense Refill    Emollient (DERMAPHOR) OINT ointment Apply topically 3 times daily as needed (skin irritation) 454 g 0    lisinopril

## 2024-07-09 NOTE — CONSULTS
Cardiovascular Consultation     Attending Physician: Ruth Powers MD    PATIENT: Hamlet Mcgrath  : 1965  MRN: [id number]    Reason for Consultation:   Chief Complaint   Patient presents with    Altered Mental Status     Pt was found down for unknown amount of time for daughter in [place].      History of present illness:   Mr. Hamlet Mcgrath is a 59 y.o. male patient with a history of COPD/ cigarette smoking who was brought into ER yesterday, after being found on the floor of his apartment. Hamlet states he had grown progressively weak and was unable to get up from the floor for several days. Denies falls, lightheadedness, syncope. Denies palpitations or chest pain.   His two daughters are at bedside.    He was seen by a cardiologist in 2017 for shortness of breath and LE edema. Normal echo at that time. He was referred for PFTs. He has continued to smoke daily. He continued to follow with the Wound clinic for bilateral LE venous insufficiency/lymphedema. Ultimately, he states it became difficult to keep up with multiple appointments and he was lost to all follow up, admitting to stopping his antihypertensives (three listed at last PCP appointment) as well.   Today, he reports fatigue,     Medical History:      Diagnosis Date    COPD (chronic obstructive pulmonary disease) (HCC)        Surgical History:      Procedure Laterality Date    EYE SURGERY Left     weak eye muscle       Social History:  Social History     Socioeconomic History    Marital status: Single     Spouse name: Not on file    Number of children: Not on file    Years of education: Not on file    Highest education level: Not on file   Occupational History    Not on file   Tobacco Use    Smoking status: Every Day     Current packs/day: 1.00     Types: Cigarettes    Smokeless tobacco: Current   Vaping Use    Vaping Use: Never used   Substance and Sexual Activity    Alcohol use: Yes     Alcohol/week: 12.0 standard drinks of alcohol      [id number]      NOTE:  This report was transcribed using voice recognition software.  Every effort was made to ensure accuracy; however, inadvertent computerized transcription errors may be present.

## 2024-07-10 ENCOUNTER — APPOINTMENT (OUTPATIENT)
Dept: GENERAL RADIOLOGY | Age: 59
End: 2024-07-10
Payer: MEDICARE

## 2024-07-10 ENCOUNTER — APPOINTMENT (OUTPATIENT)
Dept: CT IMAGING | Age: 59
End: 2024-07-10
Payer: MEDICARE

## 2024-07-10 PROBLEM — K63.89 PNEUMATOSIS INTESTINALIS OF SMALL INTESTINE: Status: ACTIVE | Noted: 2024-07-10

## 2024-07-10 LAB
ALBUMIN SERPL-MCNC: 2.5 G/DL (ref 3.4–5)
ALBUMIN/GLOB SERPL: 0.6 {RATIO} (ref 1.1–2.2)
ALP SERPL-CCNC: 84 U/L (ref 40–129)
ALT SERPL-CCNC: 29 U/L (ref 10–40)
ANION GAP SERPL CALCULATED.3IONS-SCNC: 15 MMOL/L (ref 3–16)
ANION GAP SERPL CALCULATED.3IONS-SCNC: 17 MMOL/L (ref 3–16)
ANION GAP SERPL CALCULATED.3IONS-SCNC: 18 MMOL/L (ref 3–16)
AST SERPL-CCNC: 46 U/L (ref 15–37)
BASE EXCESS BLDA CALC-SCNC: -12.7 MMOL/L (ref -3–3)
BASE EXCESS BLDA CALC-SCNC: -15 MMOL/L (ref -3–3)
BASE EXCESS BLDA CALC-SCNC: -15.1 MMOL/L (ref -3–3)
BASE EXCESS BLDV CALC-SCNC: -12 MMOL/L (ref -3–3)
BASE EXCESS BLDV CALC-SCNC: -13 MMOL/L (ref -3–3)
BASOPHILS # BLD: 0 K/UL (ref 0–0.2)
BASOPHILS # BLD: 0 K/UL (ref 0–0.2)
BASOPHILS NFR BLD: 0.1 %
BASOPHILS NFR BLD: 0.1 %
BILIRUB SERPL-MCNC: 1 MG/DL (ref 0–1)
BUN SERPL-MCNC: 39 MG/DL (ref 7–20)
BUN SERPL-MCNC: 45 MG/DL (ref 7–20)
BUN SERPL-MCNC: 48 MG/DL (ref 7–20)
CA-I BLD-SCNC: 1.47 MMOL/L (ref 1.12–1.32)
CA-I BLD-SCNC: 1.53 MMOL/L (ref 1.12–1.32)
CALCIUM SERPL-MCNC: 10 MG/DL (ref 8.3–10.6)
CALCIUM SERPL-MCNC: 9 MG/DL (ref 8.3–10.6)
CALCIUM SERPL-MCNC: 9.9 MG/DL (ref 8.3–10.6)
CHLORIDE SERPL-SCNC: 105 MMOL/L (ref 99–110)
CHLORIDE SERPL-SCNC: 107 MMOL/L (ref 99–110)
CHLORIDE SERPL-SCNC: 108 MMOL/L (ref 99–110)
CK SERPL-CCNC: 125 U/L (ref 39–308)
CO2 BLDA-SCNC: 16.2 MMOL/L
CO2 BLDA-SCNC: 19.5 MMOL/L
CO2 BLDA-SCNC: 21 MMOL/L
CO2 BLDV-SCNC: 23 MMOL/L
CO2 BLDV-SCNC: 24 MMOL/L
CO2 SERPL-SCNC: 14 MMOL/L (ref 21–32)
CO2 SERPL-SCNC: 17 MMOL/L (ref 21–32)
CO2 SERPL-SCNC: 17 MMOL/L (ref 21–32)
COHGB MFR BLDA: 0.5 % (ref 0–1.5)
COHGB MFR BLDA: 0.7 % (ref 0–1.5)
CREAT SERPL-MCNC: 0.9 MG/DL (ref 0.9–1.3)
CREAT SERPL-MCNC: 2 MG/DL (ref 0.9–1.3)
CREAT SERPL-MCNC: 2.5 MG/DL (ref 0.9–1.3)
D-DIMER QUANTITATIVE: 7.76 UG/ML FEU (ref 0–0.6)
DEPRECATED RDW RBC AUTO: 16.8 % (ref 12.4–15.4)
DEPRECATED RDW RBC AUTO: 17.1 % (ref 12.4–15.4)
EKG ATRIAL RATE: 118 BPM
EKG DIAGNOSIS: NORMAL
EKG P AXIS: 66 DEGREES
EKG P-R INTERVAL: 134 MS
EKG Q-T INTERVAL: 308 MS
EKG QRS DURATION: 84 MS
EKG QTC CALCULATION (BAZETT): 431 MS
EKG R AXIS: 81 DEGREES
EKG T AXIS: 36 DEGREES
EKG VENTRICULAR RATE: 118 BPM
EOSINOPHIL # BLD: 0 K/UL (ref 0–0.6)
EOSINOPHIL # BLD: 0 K/UL (ref 0–0.6)
EOSINOPHIL NFR BLD: 0.1 %
EOSINOPHIL NFR BLD: 0.2 %
GFR SERPLBLD CREATININE-BSD FMLA CKD-EPI: 29 ML/MIN/{1.73_M2}
GFR SERPLBLD CREATININE-BSD FMLA CKD-EPI: 38 ML/MIN/{1.73_M2}
GFR SERPLBLD CREATININE-BSD FMLA CKD-EPI: >90 ML/MIN/{1.73_M2}
GLUCOSE BLD-MCNC: 128 MG/DL (ref 70–99)
GLUCOSE BLD-MCNC: 155 MG/DL (ref 70–99)
GLUCOSE SERPL-MCNC: 148 MG/DL (ref 70–99)
GLUCOSE SERPL-MCNC: 168 MG/DL (ref 70–99)
GLUCOSE SERPL-MCNC: 93 MG/DL (ref 70–99)
HCO3 BLDA-SCNC: 14.6 MMOL/L (ref 21–29)
HCO3 BLDA-SCNC: 17.6 MMOL/L (ref 21–29)
HCO3 BLDA-SCNC: 18 MMOL/L (ref 21–29)
HCO3 BLDV-SCNC: 20.3 MMOL/L (ref 23–29)
HCO3 BLDV-SCNC: 20.3 MMOL/L (ref 23–29)
HCT VFR BLD AUTO: 29.9 % (ref 40.5–52.5)
HCT VFR BLD AUTO: 30.7 % (ref 40.5–52.5)
HCT VFR BLD AUTO: 33 % (ref 40.5–52.5)
HCT VFR BLD AUTO: 35 % (ref 40.5–52.5)
HGB BLD CALC-MCNC: 11.2 GM/DL (ref 13.5–17.5)
HGB BLD CALC-MCNC: 12 GM/DL (ref 13.5–17.5)
HGB BLD-MCNC: 9.5 G/DL (ref 13.5–17.5)
HGB BLD-MCNC: 9.8 G/DL (ref 13.5–17.5)
HGB BLDA-MCNC: 10.1 G/DL (ref 13.5–17.5)
HGB BLDA-MCNC: 11.3 G/DL (ref 13.5–17.5)
INR PPP: 1.52 (ref 0.85–1.15)
LACTATE BLD-SCNC: 5.3 MMOL/L (ref 0.4–2)
LACTATE BLD-SCNC: 5.55 MMOL/L (ref 0.4–2)
LACTATE BLDV-SCNC: 3.1 MMOL/L (ref 0.4–2)
LACTATE BLDV-SCNC: 3.4 MMOL/L (ref 0.4–2)
LACTATE BLDV-SCNC: 4.1 MMOL/L (ref 0.4–2)
LACTATE BLDV-SCNC: 6 MMOL/L (ref 0.4–2)
LYMPHOCYTES # BLD: 0.8 K/UL (ref 1–5.1)
LYMPHOCYTES # BLD: 1.3 K/UL (ref 1–5.1)
LYMPHOCYTES NFR BLD: 6.1 %
LYMPHOCYTES NFR BLD: 7.6 %
MAGNESIUM SERPL-MCNC: 2.1 MG/DL (ref 1.8–2.4)
MCH RBC QN AUTO: 29.3 PG (ref 26–34)
MCH RBC QN AUTO: 29.9 PG (ref 26–34)
MCHC RBC AUTO-ENTMCNC: 31.8 G/DL (ref 31–36)
MCHC RBC AUTO-ENTMCNC: 31.8 G/DL (ref 31–36)
MCV RBC AUTO: 92.3 FL (ref 80–100)
MCV RBC AUTO: 93.9 FL (ref 80–100)
METHGB MFR BLDA: 0.6 %
METHGB MFR BLDA: 1 %
MONOCYTES # BLD: 0.4 K/UL (ref 0–1.3)
MONOCYTES # BLD: 1 K/UL (ref 0–1.3)
MONOCYTES NFR BLD: 2.3 %
MONOCYTES NFR BLD: 7.4 %
NEUTROPHILS # BLD: 11.4 K/UL (ref 1.7–7.7)
NEUTROPHILS # BLD: 15.1 K/UL (ref 1.7–7.7)
NEUTROPHILS NFR BLD: 86.2 %
NEUTROPHILS NFR BLD: 89.9 %
O2 THERAPY: ABNORMAL
O2 THERAPY: ABNORMAL
OSMOLALITY SERPL: 318 MOSM/KG (ref 275–295)
PCO2 BLDA: 51.1 MMHG (ref 35–45)
PCO2 BLDA: 61 MMHG (ref 35–45)
PCO2 BLDA: 89.1 MM HG (ref 35–45)
PCO2 BLDV: 106.6 MM HG (ref 40–50)
PCO2 BLDV: 95.8 MM HG (ref 40–50)
PERFORMED ON: ABNORMAL
PH BLDA: 6.91 [PH] (ref 7.35–7.45)
PH BLDA: 7.06 [PH] (ref 7.35–7.45)
PH BLDA: 7.07 [PH] (ref 7.35–7.45)
PH BLDV: 6.89 [PH] (ref 7.35–7.45)
PH BLDV: 6.93 [PH] (ref 7.35–7.45)
PLATELET # BLD AUTO: 422 K/UL (ref 135–450)
PLATELET # BLD AUTO: 427 K/UL (ref 135–450)
PMV BLD AUTO: 7.2 FL (ref 5–10.5)
PMV BLD AUTO: 7.2 FL (ref 5–10.5)
PO2 BLDA: 217 MMHG (ref 75–108)
PO2 BLDA: 290 MMHG (ref 75–108)
PO2 BLDA: 371.2 MM HG (ref 75–108)
PO2 BLDV: 48 MM HG
PO2 BLDV: 49 MM HG
POC SAMPLE TYPE: ABNORMAL
POTASSIUM BLD-SCNC: 4.2 MMOL/L (ref 3.5–5.1)
POTASSIUM BLD-SCNC: 5.2 MMOL/L (ref 3.5–5.1)
POTASSIUM SERPL-SCNC: 3.8 MMOL/L (ref 3.5–5.1)
POTASSIUM SERPL-SCNC: 4.7 MMOL/L (ref 3.5–5.1)
POTASSIUM SERPL-SCNC: 5.1 MMOL/L (ref 3.5–5.1)
PROT SERPL-MCNC: 6.6 G/DL (ref 6.4–8.2)
PROTHROMBIN TIME: 18.4 SEC (ref 11.9–14.9)
RBC # BLD AUTO: 3.18 M/UL (ref 4.2–5.9)
RBC # BLD AUTO: 3.33 M/UL (ref 4.2–5.9)
SAO2 % BLDA: 100 % (ref 93–100)
SAO2 % BLDA: 98.7 %
SAO2 % BLDA: 99.2 %
SAO2 % BLDV: 54 %
SAO2 % BLDV: 55 %
SODIUM BLD-SCNC: 142 MMOL/L (ref 136–145)
SODIUM BLD-SCNC: 143 MMOL/L (ref 136–145)
SODIUM SERPL-SCNC: 137 MMOL/L (ref 136–145)
SODIUM SERPL-SCNC: 140 MMOL/L (ref 136–145)
SODIUM SERPL-SCNC: 141 MMOL/L (ref 136–145)
TROPONIN, HIGH SENSITIVITY: 73 NG/L (ref 0–22)
WBC # BLD AUTO: 13.2 K/UL (ref 4–11)
WBC # BLD AUTO: 16.8 K/UL (ref 4–11)

## 2024-07-10 PROCEDURE — 0BH17EZ INSERTION OF ENDOTRACHEAL AIRWAY INTO TRACHEA, VIA NATURAL OR ARTIFICIAL OPENING: ICD-10-PCS | Performed by: FAMILY MEDICINE

## 2024-07-10 PROCEDURE — 2580000003 HC RX 258: Performed by: INTERNAL MEDICINE

## 2024-07-10 PROCEDURE — C1751 CATH, INF, PER/CENT/MIDLINE: HCPCS

## 2024-07-10 PROCEDURE — 86900 BLOOD TYPING SEROLOGIC ABO: CPT

## 2024-07-10 PROCEDURE — 83735 ASSAY OF MAGNESIUM: CPT

## 2024-07-10 PROCEDURE — 6360000002 HC RX W HCPCS: Performed by: INTERNAL MEDICINE

## 2024-07-10 PROCEDURE — 6360000002 HC RX W HCPCS: Performed by: FAMILY MEDICINE

## 2024-07-10 PROCEDURE — 99233 SBSQ HOSP IP/OBS HIGH 50: CPT | Performed by: INTERNAL MEDICINE

## 2024-07-10 PROCEDURE — 05H933Z INSERTION OF INFUSION DEVICE INTO RIGHT BRACHIAL VEIN, PERCUTANEOUS APPROACH: ICD-10-PCS | Performed by: FAMILY MEDICINE

## 2024-07-10 PROCEDURE — 2000000000 HC ICU R&B

## 2024-07-10 PROCEDURE — 2580000003 HC RX 258: Performed by: NURSE PRACTITIONER

## 2024-07-10 PROCEDURE — 5A12012 PERFORMANCE OF CARDIAC OUTPUT, SINGLE, MANUAL: ICD-10-PCS | Performed by: FAMILY MEDICINE

## 2024-07-10 PROCEDURE — 71045 X-RAY EXAM CHEST 1 VIEW: CPT

## 2024-07-10 PROCEDURE — 2500000003 HC RX 250 WO HCPCS

## 2024-07-10 PROCEDURE — 94761 N-INVAS EAR/PLS OXIMETRY MLT: CPT

## 2024-07-10 PROCEDURE — 97530 THERAPEUTIC ACTIVITIES: CPT

## 2024-07-10 PROCEDURE — 82330 ASSAY OF CALCIUM: CPT

## 2024-07-10 PROCEDURE — 84132 ASSAY OF SERUM POTASSIUM: CPT

## 2024-07-10 PROCEDURE — 84295 ASSAY OF SERUM SODIUM: CPT

## 2024-07-10 PROCEDURE — 36620 INSERTION CATHETER ARTERY: CPT

## 2024-07-10 PROCEDURE — 82550 ASSAY OF CK (CPK): CPT

## 2024-07-10 PROCEDURE — 5A1935Z RESPIRATORY VENTILATION, LESS THAN 24 CONSECUTIVE HOURS: ICD-10-PCS | Performed by: FAMILY MEDICINE

## 2024-07-10 PROCEDURE — 86901 BLOOD TYPING SEROLOGIC RH(D): CPT

## 2024-07-10 PROCEDURE — 2580000003 HC RX 258: Performed by: FAMILY MEDICINE

## 2024-07-10 PROCEDURE — 36569 INSJ PICC 5 YR+ W/O IMAGING: CPT

## 2024-07-10 PROCEDURE — 99222 1ST HOSP IP/OBS MODERATE 55: CPT | Performed by: SURGERY

## 2024-07-10 PROCEDURE — 6370000000 HC RX 637 (ALT 250 FOR IP): Performed by: STUDENT IN AN ORGANIZED HEALTH CARE EDUCATION/TRAINING PROGRAM

## 2024-07-10 PROCEDURE — 37799 UNLISTED PX VASCULAR SURGERY: CPT

## 2024-07-10 PROCEDURE — 2700000000 HC OXYGEN THERAPY PER DAY

## 2024-07-10 PROCEDURE — 93005 ELECTROCARDIOGRAM TRACING: CPT

## 2024-07-10 PROCEDURE — 36415 COLL VENOUS BLD VENIPUNCTURE: CPT

## 2024-07-10 PROCEDURE — 85610 PROTHROMBIN TIME: CPT

## 2024-07-10 PROCEDURE — 85025 COMPLETE CBC W/AUTO DIFF WBC: CPT

## 2024-07-10 PROCEDURE — 2500000003 HC RX 250 WO HCPCS: Performed by: INTERNAL MEDICINE

## 2024-07-10 PROCEDURE — 03HY32Z INSERTION OF MONITORING DEVICE INTO UPPER ARTERY, PERCUTANEOUS APPROACH: ICD-10-PCS | Performed by: FAMILY MEDICINE

## 2024-07-10 PROCEDURE — 84484 ASSAY OF TROPONIN QUANT: CPT

## 2024-07-10 PROCEDURE — 93005 ELECTROCARDIOGRAM TRACING: CPT | Performed by: INTERNAL MEDICINE

## 2024-07-10 PROCEDURE — 85014 HEMATOCRIT: CPT

## 2024-07-10 PROCEDURE — 83605 ASSAY OF LACTIC ACID: CPT

## 2024-07-10 PROCEDURE — 82947 ASSAY GLUCOSE BLOOD QUANT: CPT

## 2024-07-10 PROCEDURE — 82803 BLOOD GASES ANY COMBINATION: CPT

## 2024-07-10 PROCEDURE — 97162 PT EVAL MOD COMPLEX 30 MIN: CPT

## 2024-07-10 PROCEDURE — 6370000000 HC RX 637 (ALT 250 FOR IP)

## 2024-07-10 PROCEDURE — 80053 COMPREHEN METABOLIC PANEL: CPT

## 2024-07-10 PROCEDURE — 97535 SELF CARE MNGMENT TRAINING: CPT

## 2024-07-10 PROCEDURE — 74176 CT ABD & PELVIS W/O CONTRAST: CPT

## 2024-07-10 PROCEDURE — 6360000002 HC RX W HCPCS: Performed by: STUDENT IN AN ORGANIZED HEALTH CARE EDUCATION/TRAINING PROGRAM

## 2024-07-10 PROCEDURE — 83930 ASSAY OF BLOOD OSMOLALITY: CPT

## 2024-07-10 PROCEDURE — 93010 ELECTROCARDIOGRAM REPORT: CPT | Performed by: INTERNAL MEDICINE

## 2024-07-10 PROCEDURE — 6360000002 HC RX W HCPCS: Performed by: NURSE PRACTITIONER

## 2024-07-10 PROCEDURE — 97166 OT EVAL MOD COMPLEX 45 MIN: CPT

## 2024-07-10 PROCEDURE — 6370000000 HC RX 637 (ALT 250 FOR IP): Performed by: FAMILY MEDICINE

## 2024-07-10 PROCEDURE — 86850 RBC ANTIBODY SCREEN: CPT

## 2024-07-10 PROCEDURE — 85379 FIBRIN DEGRADATION QUANT: CPT

## 2024-07-10 RX ORDER — NOREPINEPHRINE BITARTRATE 0.06 MG/ML
1-100 INJECTION, SOLUTION INTRAVENOUS CONTINUOUS
Status: DISCONTINUED | OUTPATIENT
Start: 2024-07-10 | End: 2024-07-11 | Stop reason: HOSPADM

## 2024-07-10 RX ORDER — 0.9 % SODIUM CHLORIDE 0.9 %
1000 INTRAVENOUS SOLUTION INTRAVENOUS ONCE
Status: COMPLETED | OUTPATIENT
Start: 2024-07-10 | End: 2024-07-10

## 2024-07-10 RX ORDER — SODIUM CHLORIDE 9 MG/ML
INJECTION, SOLUTION INTRAVENOUS PRN
Status: DISCONTINUED | OUTPATIENT
Start: 2024-07-10 | End: 2024-07-11 | Stop reason: HOSPADM

## 2024-07-10 RX ORDER — NOREPINEPHRINE BITARTRATE 0.06 MG/ML
INJECTION, SOLUTION INTRAVENOUS
Status: COMPLETED
Start: 2024-07-10 | End: 2024-07-10

## 2024-07-10 RX ORDER — AMIODARONE HYDROCHLORIDE 150 MG/3ML
INJECTION, SOLUTION INTRAVENOUS
Status: COMPLETED | OUTPATIENT
Start: 2024-07-10 | End: 2024-07-10

## 2024-07-10 RX ORDER — EPINEPHRINE IN SOD CHLOR,ISO 1 MG/10 ML
SYRINGE (ML) INTRAVENOUS
Status: COMPLETED | OUTPATIENT
Start: 2024-07-10 | End: 2024-07-10

## 2024-07-10 RX ORDER — SODIUM CHLORIDE 0.9 % (FLUSH) 0.9 %
5-40 SYRINGE (ML) INJECTION EVERY 12 HOURS SCHEDULED
Status: DISCONTINUED | OUTPATIENT
Start: 2024-07-10 | End: 2024-07-11 | Stop reason: HOSPADM

## 2024-07-10 RX ORDER — FENTANYL CITRATE-0.9 % NACL/PF 10 MCG/ML
25-200 PLASTIC BAG, INJECTION (ML) INTRAVENOUS CONTINUOUS
Status: DISCONTINUED | OUTPATIENT
Start: 2024-07-10 | End: 2024-07-11 | Stop reason: HOSPADM

## 2024-07-10 RX ORDER — LORAZEPAM 2 MG/ML
0.5 INJECTION INTRAMUSCULAR EVERY 6 HOURS PRN
Status: DISCONTINUED | OUTPATIENT
Start: 2024-07-10 | End: 2024-07-11 | Stop reason: HOSPADM

## 2024-07-10 RX ORDER — PROPOFOL 10 MG/ML
5-50 INJECTION, EMULSION INTRAVENOUS CONTINUOUS
Status: DISCONTINUED | OUTPATIENT
Start: 2024-07-10 | End: 2024-07-11 | Stop reason: HOSPADM

## 2024-07-10 RX ORDER — SODIUM CHLORIDE 9 MG/ML
INJECTION, SOLUTION INTRAVENOUS CONTINUOUS
Status: DISCONTINUED | OUTPATIENT
Start: 2024-07-10 | End: 2024-07-10

## 2024-07-10 RX ORDER — OXYCODONE HYDROCHLORIDE 5 MG/1
5 TABLET ORAL EVERY 4 HOURS PRN
Status: DISCONTINUED | OUTPATIENT
Start: 2024-07-10 | End: 2024-07-11 | Stop reason: HOSPADM

## 2024-07-10 RX ORDER — VANCOMYCIN 2 G/400ML
2000 INJECTION, SOLUTION INTRAVENOUS ONCE
Status: COMPLETED | OUTPATIENT
Start: 2024-07-10 | End: 2024-07-10

## 2024-07-10 RX ORDER — SODIUM CHLORIDE 0.9 % (FLUSH) 0.9 %
5-40 SYRINGE (ML) INJECTION PRN
Status: DISCONTINUED | OUTPATIENT
Start: 2024-07-10 | End: 2024-07-11 | Stop reason: HOSPADM

## 2024-07-10 RX ADMIN — HYDROMORPHONE HYDROCHLORIDE 1 MG: 1 INJECTION, SOLUTION INTRAMUSCULAR; INTRAVENOUS; SUBCUTANEOUS at 00:12

## 2024-07-10 RX ADMIN — VASOPRESSIN 0.04 UNITS/MIN: 20 INJECTION INTRAVENOUS at 19:37

## 2024-07-10 RX ADMIN — Medication 50 MCG/HR: at 17:36

## 2024-07-10 RX ADMIN — Medication: at 10:06

## 2024-07-10 RX ADMIN — ENOXAPARIN SODIUM 30 MG: 100 INJECTION SUBCUTANEOUS at 20:58

## 2024-07-10 RX ADMIN — FUROSEMIDE 40 MG: 10 INJECTION, SOLUTION INTRAMUSCULAR; INTRAVENOUS at 09:39

## 2024-07-10 RX ADMIN — Medication 95 MCG/MIN: at 21:40

## 2024-07-10 RX ADMIN — DEXTROSE 1 MG/MIN: 5 SOLUTION INTRAVENOUS at 18:43

## 2024-07-10 RX ADMIN — ENOXAPARIN SODIUM 30 MG: 100 INJECTION SUBCUTANEOUS at 09:38

## 2024-07-10 RX ADMIN — PIPERACILLIN AND TAZOBACTAM 4500 MG: 4; .5 INJECTION, POWDER, LYOPHILIZED, FOR SOLUTION INTRAVENOUS at 20:55

## 2024-07-10 RX ADMIN — HYDROMORPHONE HYDROCHLORIDE 1 MG: 1 INJECTION, SOLUTION INTRAMUSCULAR; INTRAVENOUS; SUBCUTANEOUS at 11:55

## 2024-07-10 RX ADMIN — AMIODARONE HYDROCHLORIDE 300 MG: 50 INJECTION, SOLUTION INTRAVENOUS at 15:36

## 2024-07-10 RX ADMIN — SODIUM CHLORIDE 1000 ML: 9 INJECTION, SOLUTION INTRAVENOUS at 20:46

## 2024-07-10 RX ADMIN — LISINOPRIL 5 MG: 5 TABLET ORAL at 09:38

## 2024-07-10 RX ADMIN — EPINEPHRINE 1 MG: 0.1 INJECTION INTRACARDIAC; INTRAVENOUS at 15:34

## 2024-07-10 RX ADMIN — PHENYLEPHRINE HYDROCHLORIDE 15 MCG/MIN: 10 INJECTION INTRAVENOUS at 21:05

## 2024-07-10 RX ADMIN — HYDROMORPHONE HYDROCHLORIDE 1 MG: 1 INJECTION, SOLUTION INTRAMUSCULAR; INTRAVENOUS; SUBCUTANEOUS at 04:39

## 2024-07-10 RX ADMIN — SODIUM CHLORIDE, PRESERVATIVE FREE 10 ML: 5 INJECTION INTRAVENOUS at 10:10

## 2024-07-10 RX ADMIN — SODIUM CHLORIDE: 9 INJECTION, SOLUTION INTRAVENOUS at 21:48

## 2024-07-10 RX ADMIN — Medication 10 MCG/MIN: at 16:27

## 2024-07-10 RX ADMIN — VANCOMYCIN 2000 MG: 2 INJECTION, SOLUTION INTRAVENOUS at 21:26

## 2024-07-10 RX ADMIN — EPINEPHRINE 1 MG: 0.1 INJECTION INTRACARDIAC; INTRAVENOUS at 15:37

## 2024-07-10 RX ADMIN — CARVEDILOL 3.12 MG: 3.12 TABLET, FILM COATED ORAL at 09:38

## 2024-07-10 RX ADMIN — ONDANSETRON 4 MG: 2 INJECTION INTRAMUSCULAR; INTRAVENOUS at 10:15

## 2024-07-10 RX ADMIN — OXYCODONE 5 MG: 5 TABLET ORAL at 10:03

## 2024-07-10 RX ADMIN — SODIUM CHLORIDE, PRESERVATIVE FREE 40 MG: 5 INJECTION INTRAVENOUS at 22:49

## 2024-07-10 RX ADMIN — PROPOFOL 20 MCG/KG/MIN: 10 INJECTION, EMULSION INTRAVENOUS at 21:56

## 2024-07-10 RX ADMIN — EPINEPHRINE 1 MG: 0.1 INJECTION INTRACARDIAC; INTRAVENOUS at 15:30

## 2024-07-10 RX ADMIN — SODIUM BICARBONATE 50 MEQ: 84 INJECTION INTRAVENOUS at 17:38

## 2024-07-10 RX ADMIN — SODIUM CHLORIDE, PRESERVATIVE FREE 5 ML: 5 INJECTION INTRAVENOUS at 19:55

## 2024-07-10 RX ADMIN — PROPOFOL 20 MCG/KG/MIN: 10 INJECTION, EMULSION INTRAVENOUS at 17:37

## 2024-07-10 ASSESSMENT — PULMONARY FUNCTION TESTS
PIF_VALUE: 32
PIF_VALUE: 33
PIF_VALUE: 30
PIF_VALUE: 32
PIF_VALUE: 31
PIF_VALUE: 33
PIF_VALUE: 32
PIF_VALUE: 32
PIF_VALUE: 31
PIF_VALUE: 32
PIF_VALUE: 31
PIF_VALUE: 32
PIF_VALUE: 18
PIF_VALUE: 33
PIF_VALUE: 31
PIF_VALUE: 32
PIF_VALUE: 31
PIF_VALUE: 32
PIF_VALUE: 31
PIF_VALUE: 32
PIF_VALUE: 18
PIF_VALUE: 32
PIF_VALUE: 31
PIF_VALUE: 32

## 2024-07-10 ASSESSMENT — PAIN SCALES - GENERAL
PAINLEVEL_OUTOF10: 3
PAINLEVEL_OUTOF10: 8
PAINLEVEL_OUTOF10: 8
PAINLEVEL_OUTOF10: 9
PAINLEVEL_OUTOF10: 7

## 2024-07-10 NOTE — PROCEDURES
Arterial Catheter Insertion Procedure Note    Consent: Unable to be obtained due to the emergent nature of this procedure.    Procedure: Insertion of Arterial Catheter    Indications:  Hypotension, Shock    Estimated Blood Loss: Minimal    Procedure Details   Informed consent was obtained for the procedure, including sedation.  Risks of hemorrhage, arrhythmia, infection and adverse drug reaction were discussed.   A time out was performed at 1600.  Ultrasound used and Right Radial artery was noted to be patent.  Collatoral flow was confirmed with an Dylan's Test.  Under sterile conditions the skin above the Right Radial artery was prepped with Chloraprep and covered with a sterile drape after donning mask, sterile gown, annd sterile gloves.  A 22-gauge needle was inserted into the Right Radial artery.  A guide wire was then passed easily through the needle which was advanced with no resistance. Good flash of blood returned. The catheter was advanced over the existing wire without resistance or complication and subsequently sutured into place after pressure tubing connected and waveform verified on monitor.    Findings:  There were no complications nor changes to vital signs. Patient tolerated procedure well.    Total time of procedure: 15 minutes

## 2024-07-10 NOTE — CONSULTS
Heart Failure Diet Education:    Per hospital protocol or consult, patient being seen for Heart Failure diet guidelines.    Learners: [x]Patient []Family []Caregiver [] Other: ______________  Methods: []Verbal Education  [x]Handouts  []Teachback     Educational Materials Provided:   [x] Nutrition Care Manual (NCM) Heart Failure Nutrition Therapy     -Diet Recommendations: 2000 mg Sodium/day, 64 oz Fluids/day    Additional Comments: Pt seen in room w/ family member. They report that pt is not feeling well today and is not interested in discussing diet at this time. Pt was provided w/ heart failure diet education handout from nutrition care manual. Pt encouraged to ask to speak w/ dietitian if any nutrition questions/concerns arise       Electronically signed by Sascha Farrell RD on 7/10/2024 at 10:48 AM    [id number]

## 2024-07-10 NOTE — CARE COORDINATION
Chart review done, rounds completed. Pt is from home, with extensive wound care needs. PT/OT rec for SNF, does not require a precert or anything. Likely another 2days, due to pain management and care.   Met with dtr and pt in room to give SNF list, she will call with choices.     Bjorn Torrez LMSW, [facility] Social Work Case Management   Phone: [phone]  Fax: [phone]

## 2024-07-10 NOTE — PROGRESS NOTES
[facility]    Respiratory Care   Intubation Assessment        Name:  Hamlet Mcgrath  Medical Record Number:  [id number]  Age: 59 y.o.   Gender: male  : 1965  Today's Date:  7/10/2024  Room:  S8M-8092/4276-01      Assessment        Patient Admission Diagnosis        Allergies  No Known Allergies     BP (!) 170/116   Pulse (!) 155   Temp 97.5 °F (36.4 °C) (Axillary)   Resp 20   Ht 1.702 m (5' 7\")   Wt 128.4 kg (283 lb 1.1 oz)   SpO2 90%   BMI 44.34 kg/m²     Patient Active Problem List   Diagnosis    Bilateral lower extremity edema    Shortness of breath    COPD (chronic obstructive pulmonary disease) (HCC)    Leg ulcer, right, limited to breakdown of skin (HCC)    Leg swelling    Lymphedema of both lower extremities    Tobacco abuse    Venous (peripheral) insufficiency    Morbid obesity (HCC)    Dermatitis    Acute on chronic systolic CHF, NYHA class 2 and ACC/AHA stage C (HCC)         Social History    Social History     Tobacco Use    Smoking status: Every Day     Current packs/day: 1.00     Types: Cigarettes    Smokeless tobacco: Current   Vaping Use    Vaping Use: Never used   Substance Use Topics    Alcohol use: Yes     Alcohol/week: 12.0 standard drinks of alcohol     Types: 12 Cans of beer per week     Comment: quit liquor about 6 months ago    Drug use: No       Patient was intubated for Respiratory Failure without difficulties  A 7.5 Endotracheal tube was visualized passing thru the vocal cords via glidescope and was secured at 23 cm at the Lip line. The ET tube was placed on the 1st attempt.  Tube placement was confirmed by CO2 detector, ETCO2, and bilateral breath sounds  A chest X ray will be obtained.    MD was present.      Electronically signed by Saskia Giron RCP on 7/10/2024 at 3:51 PM

## 2024-07-10 NOTE — PROGRESS NOTES
Suicide precautions initiated. Daughter made aware. Sitter present in room. Psych consult placed.

## 2024-07-10 NOTE — PROGRESS NOTES
Physical Therapy  Facility/Department: [address] MED SURG  Physical Therapy Initial Assessment    Name: Hamlet Mcgrath  : 1965  MRN: [id number]  Date of Service: 7/10/2024    Discharge Recommendations: Hamlet Mcgrath scored a 10/24 on the AM-PAC short mobility form. Current research shows that an AM-PAC score of 17 or less is typically not associated with a discharge to the patient's home setting. Based on the patient's AM-PAC score and their current functional mobility deficits, it is recommended that the patient have 3-5 sessions per week of Physical Therapy at d/c to increase the patient's independence.  Please see assessment section for further patient specific details.    If patient discharges prior to next session this note will serve as a discharge summary.  Please see below for the latest assessment towards goals.    Subacute/Skilled Nursing Facility, Patient would benefit from continued therapy after discharge   PT Equipment Recommendations  Equipment Needed: No (Defer to next level of care)      Patient Diagnosis(es): The primary encounter diagnosis was Elevated CK. Diagnoses of Elevated BUN, Bilateral leg edema, Unable to ambulate, Hypoalbuminemia, and Acute on chronic congestive heart failure, unspecified heart failure type (HCC) were also pertinent to this visit.  Past Medical History:  has a past medical history of COPD (chronic obstructive pulmonary disease) (HCC).  Past Surgical History:  has a past surgical history that includes Eye surgery (Left, ).    Assessment   Body Structures, Functions, Activity Limitations Requiring Skilled Therapeutic Intervention: Decreased functional mobility ;Decreased strength;Decreased endurance;Decreased balance;Increased pain;Decreased cognition;Decreased ROM;Decreased posture  Assessment: Pt is a 58 y/o male who presents to the hospital with difficulty caring for himself at home. At baseline, the pt lives alone and is independent for performance of  to/from sit with CGA  Short Term Goal 2: Pt will transfer sit to/from stand with mod A x 2  Patient Goals   Patient Goals : Pt did not state     Education  Patient Education  Education Given To: Patient  Education Provided: Role of Therapy;Plan of Care;Transfer Training  Education Provided Comments: General safety  Education Method: Verbal  Barriers to Learning: None  Education Outcome: Verbalized understanding;Continued education needed    Therapy Time   Individual Concurrent Group Co-treatment   Time In       1037   Time Out       1132   Minutes       55   Timed Code Treatment Minutes: 40 Minutes     Second Session Therapy Time:   Individual Concurrent Group Co-treatment   Time In       1139   Time Out       1153   Minutes       14     Timed Code Treatment Minutes: 40 + 14 Minutes    Total Treatment Minutes: 69 Minutes    Lexus Couch, PT  Lexus Couch PT, DPT [id number]

## 2024-07-10 NOTE — PROGRESS NOTES
Sitter noted pt vomiting and unresponsive, called charge nurse advised to call a rapid. Emesis noted running out of mouth and nose. Pupils dilated, Not responsive to verbal commands. No pulse palpated CPR initiated.

## 2024-07-10 NOTE — PROGRESS NOTES
Occupational Therapy  Facility/Department: [address] MED SURG  Occupational Therapy Initial Assessment    Name: Hamlet Mcgrath  : 1965  MRN: [id number]  Date of Service: 7/10/2024    Discharge Recommendations:  3-5 sessions per week, Patient would benefit from continued therapy after discharge     Hamlet Mcgrath scored a 14/24 on the AM-PAC ADL Inpatient form. Current research shows that an AM-PAC score of 17 or less is typically not associated with a discharge to the patient's home setting. Based on the patient's AM-PAC score and their current ADL deficits, it is recommended that the patient have 3-5 sessions per week of Occupational Therapy at d/c to increase the patient's independence.  Please see assessment section for further patient specific details.    If patient discharges prior to next session this note will serve as a discharge summary.  Please see below for the latest assessment towards goals.     Patient Diagnosis(es): The primary encounter diagnosis was Elevated CK. Diagnoses of Elevated BUN, Bilateral leg edema, Unable to ambulate, Hypoalbuminemia, and Acute on chronic congestive heart failure, unspecified heart failure type (HCC) were also pertinent to this visit.  Past Medical History:  has a past medical history of COPD (chronic obstructive pulmonary disease) (HCC).  Past Surgical History:  has a past surgical history that includes Eye surgery (Left, ).    Treatment Diagnosis: Decreased: ADLs, functional transfers/mobility      Assessment   Performance deficits / Impairments: Decreased functional mobility ;Decreased ADL status;Decreased endurance;Decreased strength;Decreased high-level IADLs;Decreased cognition  Assessment: Pt is a 58 yo M who presented with severe angioedema of B LE. Was found down by his daughter at home who was visiting from [place] of state, pt had reportedly been down several days. Admitted with acute on chronic CHF. At baseline, pt lives alone in an apt where he is  on and taking off regular lower body clothing?: Total  How much help is needed for bathing (which includes washing, rinsing, drying)?: A Lot  How much help is needed for toileting (which includes using toilet, bedpan, or urinal)?: Total  How much help is needed for putting on and taking off regular upper body clothing?: A Little  How much help is needed for taking care of personal grooming?: A Little  How much help for eating meals?: None  AM-[facility] Inpatient Daily Activity Raw Score: 14  AM-PAC Inpatient ADL T-Scale Score : 33.39  ADL Inpatient CMS 0-100% Score: 59.67  ADL Inpatient CMS G-Code Modifier : CK    Goals  Short Term Goals  Time Frame for Short Term Goals: Prior to d/c  Short Term Goal 1: Pt will complete bed mobility SBA in prep for ADL tasks  Short Term Goal 2: Pt will complete seated grooming mod I  Short Term Goal 3: Pt will bathe UB with setup  Short Term Goal 4: Pt will complete ADL transfer via Stedy or RW w/ mod Ax2  Long Term Goals  Time Frame for Long Term Goals : LTG=STG  Patient Goals   Patient goals : Not stated       Therapy Time   Individual Concurrent Group Co-treatment   Time In 1037         Time Out 1132         Minutes 55         Timed Code Treatment Minutes: 40 Minutes       Shyla Byrne, OTR/L [id number]

## 2024-07-10 NOTE — PLAN OF CARE
Problem: Discharge Planning  Goal: Discharge to home or other facility with appropriate resources  7/9/2024 2217 by Khatiwada, Swastika, RN  Outcome: Progressing  Flowsheets (Taken 7/9/2024 2143)  Discharge to home or other facility with appropriate resources: Identify barriers to discharge with patient and caregiver  7/9/2024 1305 by Leslie Pruitt RN  Outcome: Progressing  Flowsheets (Taken 7/9/2024 0246 by Mariela Simons, RN)  Discharge to home or other facility with appropriate resources: Identify barriers to discharge with patient and caregiver     Problem: Pain  Goal: Verbalizes/displays adequate comfort level or baseline comfort level  7/9/2024 2217 by Khatiwada, Swastika, RN  Outcome: Progressing  7/9/2024 1305 by Leslie Pruitt RN  Outcome: Progressing     Problem: Skin/Tissue Integrity  Goal: Absence of new skin breakdown  Description: 1.  Monitor for areas of redness and/or skin breakdown  2.  Assess vascular access sites hourly  3.  Every 4-6 hours minimum:  Change oxygen saturation probe site  4.  Every 4-6 hours:  If on nasal continuous positive airway pressure, respiratory therapy assess nares and determine need for appliance change or resting period.  7/9/2024 2217 by Khatiwada, Swastika, RN  Outcome: Progressing  7/9/2024 1305 by Leslie Pruitt RN  Outcome: Progressing     Problem: Safety - Adult  Goal: Free from fall injury  7/9/2024 2217 by Khatiwada, Swastika, RN  Outcome: Progressing  7/9/2024 1305 by Leslie Pruitt RN  Outcome: Progressing     Problem: Respiratory - Adult  Goal: Achieves optimal ventilation and oxygenation  7/9/2024 2217 by Khatiwada, Swastika, RN  Outcome: Progressing  Flowsheets (Taken 7/9/2024 2143)  Achieves optimal ventilation and oxygenation: Assess for changes in respiratory status  7/9/2024 1305 by Leslie Pruitt RN  Outcome: Progressing  Flowsheets (Taken 7/9/2024 0246 by Mariela Simons, RN)  Achieves optimal ventilation and oxygenation:  Progressing  Flowsheets (Taken 7/9/2024 0246 by Mariela Simons RN)  Urinary catheter remains patent: Assess patency of urinary catheter     Problem: Metabolic/Fluid and Electrolytes - Adult  Goal: Electrolytes maintained within normal limits  7/9/2024 2217 by Khatiwada, Swastika, RN  Outcome: Progressing  Flowsheets (Taken 7/9/2024 2143)  Electrolytes maintained within normal limits: Monitor labs and assess patient for signs and symptoms of electrolyte imbalances  7/9/2024 1305 by Leslie Pruitt RN  Outcome: Progressing  Flowsheets (Taken 7/9/2024 0246 by Mariela Simons RN)  Electrolytes maintained within normal limits: Monitor labs and assess patient for signs and symptoms of electrolyte imbalances     Problem: Chronic Conditions and Co-morbidities  Goal: Patient's chronic conditions and co-morbidity symptoms are monitored and maintained or improved  7/9/2024 2217 by Khatiwada, Swastika, RN  Outcome: Progressing  Flowsheets (Taken 7/9/2024 2143)  Care Plan - Patient's Chronic Conditions and Co-Morbidity Symptoms are Monitored and Maintained or Improved: Monitor and assess patient's chronic conditions and comorbid symptoms for stability, deterioration, or improvement  7/9/2024 1305 by Leslie Pruitt RN  Outcome: Progressing

## 2024-07-10 NOTE — PROGRESS NOTES
Arrived to place PICC line with bedside RN Brittany. Pre-procedure and timeout done with RN, discussed limitations of placement and allergies. Consent confirmed. Vital signs stable. Labs, allergies, medications, and code status reviewed. No contraindications noted.     Procedure explained to pt, including the risk and benefits of the procedure. All questions answered. Pt verbalizes understanding of the procedure and states no more questions.       Pt's basilic, brachial, and cephalic are all easily collapsible with no indication for a clot. Vein selected is large enough for catheter. Pt tolerated sterile procedure well, with no difficulty accessing brachial vein, when accessed - blood was free flowing and non-pulsatile. Guidewire, introducer, and catheter went in smoothly. PICC line verified with 3CG technology with peaked P-waves (please see image below).      Nurses:  OK to use PICC.    Please replace all existing IV tubing with new IV tubing prior to using the PICC for current IV infusions.  Please remove any PIVs from PICC arm.  All of the above may be sources of infection or an increase chance of a clot.      Post procedure - reorganized pt table, placed pt in lowest position, with call light and educated on line care. Instructed pt/RN not to use arm for at least 30min to avoid bleeding. Reported off to bedside RN.      If you have any questions please call number below and dispatch will direct you to the PICC RN that is on call.    [phone]

## 2024-07-10 NOTE — PROGRESS NOTES
Pt's family appear to be supporting one another at this time.     07/10/24 1617   Encounter Summary   Encounter Overview/Reason Crisis;Spiritual/Emotional Needs   Service Provided For Family   Referral/Consult From Other (comment)  (Code)   Support System Children;Family members;Friends/neighbors   Last Encounter  07/10/24  (support to family and prayer CL)   Complexity of Encounter High   Begin Time 1540   End Time  1617   Total Time Calculated 37 min   Crisis   Type Code Blue   Spiritual/Emotional needs   Type Spiritual Support   Assessment/Intervention/Outcome   Assessment Anxious;Tearful   Intervention Active listening;Discussed illness injury and it’s impact;Discussed belief system/Buddhist practices/omaira;Explored/Affirmed feelings, thoughts, concerns;Prayer (assurance of)/[place]   Outcome Coping;Engaged in conversation;Less anxious, Less agitated;Expressed Gratitude

## 2024-07-10 NOTE — PROGRESS NOTES
Department of Podiatry Progress Note  Ramin Panchal DPM Attending     CHIEF COMPLAINT:  Wounds to both lower extremities with infection and infestation of maggots    HISTORY OF PRESENT ILLNESS:                The patient is a 59 y.o. male with significant past medical history of COPD, Anxiety about health care who is consulted for lower extremity wounds of both feet, calves and knees. Patient was found on the floor of his home by his out of state daughter. Daughter relates he has been crawling around his home on hands and knees as he cannot walk to due pain of lower extremities. Excoriations of feet, knees and elbows is due to friction of dragging. Daughter relates she found a suicide note and gun in the room where he was found. Initial interview with patient was short due to transport to CT for imaging    Patient visiting with daughter, defers nail care at this time    Past Medical History:        Diagnosis Date    COPD (chronic obstructive pulmonary disease) (HCC)      Past Surgical History:        Procedure Laterality Date    EYE SURGERY Left 1968    weak eye muscle     Current Medications:    Current Facility-Administered Medications: mineral oil-hydrophilic petrolatum (AQUAPHOR) ointment, , Topical, TID PRN  perflutren lipid microspheres (DEFINITY) injection 1.5 mL, 1.5 mL, IntraVENous, ONCE PRN  furosemide (LASIX) injection 40 mg, 40 mg, IntraVENous, BID  sodium chloride flush 0.9 % injection 5-40 mL, 5-40 mL, IntraVENous, 2 times per day  sodium chloride flush 0.9 % injection 5-40 mL, 5-40 mL, IntraVENous, PRN  0.9 % sodium chloride infusion, , IntraVENous, PRN  ondansetron (ZOFRAN-ODT) disintegrating tablet 4 mg, 4 mg, Oral, Q8H PRN **OR** ondansetron (ZOFRAN) injection 4 mg, 4 mg, IntraVENous, Q6H PRN  polyethylene glycol (GLYCOLAX) packet 17 g, 17 g, Oral, Daily PRN  acetaminophen (TYLENOL) tablet 650 mg, 650 mg, Oral, Q6H PRN **OR** acetaminophen (TYLENOL) suppository 650 mg, 650 mg, Rectal, Q6H  Bilateral. negativehistory of burning Bilateral. Deep tendon reflexes are 1 to include patellar and achilles Bilateral. [place]--Geovanny 5.07 monofilament sensitivity is abnormal 4 to 5 spots tested Bilateral.    VASCULAR:    bilaterally Dorsalis pedis is blunted due to heavy Angio edema. bilaterally Posterior tibial pulse is blunted due to heavy angio edema. 4+ edema bilaterally. moderate Varicosities bilaterally.      MUSCULOSKELETAL:  Bay[place] is  untested due to pain . Muscle strength is 3/5 to all groups tested to include dorsiflexion, plantarflexion, inversion, eversion, and digital Bilateral . The ranges of motion of all joints tested from ankle distal is decreased there is no crepitus noted Bilateral.      IMPRESSION/RECOMMENDATIONS    1. Severe Angio-Edema both lower extremities, with crusted Ichthyotic skin    2. Ichthyosis with need for skin exfoliant and nail debridement  Rx Lac Hydrin for daily application, with light compression as patient allows  Nail debridement to be performed    3. Concern for mental health with need for evaluation    Will continue to follow for skin and edema control, will off nail debridement again as patient currently defers care    Thank you for the opportunity to take part in the patient's care.    Ramin Panchal [facility]  [facility]  [phone]

## 2024-07-10 NOTE — PROGRESS NOTES
Cardiovascular Progress Note      Chief Complaint:   Chief Complaint   Patient presents with    Altered Mental Status     Pt was found down for unknown amount of time for daughter in [place].      Impression/Recommendations:    . Hamlet Mcgrath is a 59 y.o. male patient      Acute on chronic HF (right sided)  Weakness  Anemia, iron deficiency, newly diagnosed   Chronic LE venous insufficiency/lymphedema, [facility] followed with [facility]   Obesity  Probable PRASHANT  Hypertension, controlled   Fatty liver  COPD  Nicotine dependence   Medical noncompliance         Hospitalization preceded by progressive weakness at home with immobility. Iron deficiency anemia and rhabdomyolysis in initial workup. Defer to primary team.     Updated echo with normal LV/valvular function. Antihypertensives (beta blocker, ACEi) restarted.   Dilated RV. PE ruled out. Outpatient sleep study recommended; possibly updated PFTs. Counseled on importance of quitting smoking.   Diuretics likely switched to oral tomorrow. Physical exam complicated by significant bilateral lower extremity chronic lymphedema.     CHF education reinforced.                        ~salt restriction: 2,000 mg daily                         ~fluid restriction: 1500 ml daily                        ~medication compliance                        ~daily weights and notify of any significant weight gain/loss                        ~establish with CHF nurse    Interval History:  No overnight events.   Explained echo and CT findings to Hamlet. Two daughters not present at time of visit.  States he is still with significant back pain despite medications and is affecting breathing pattern and sleeping.   Denies chest pain, palpitations.      -1.7L    ProBNP 1314     7/8/24  EKG:    Sinus tachycardia  Nonspecific ST-T abnormality    7/8/2024 CXR  FINDINGS:  Medical devices: None.     Mediastinum/Heart: The mediastinal contours are normal. The heart appears  normal in size.     Lungs:

## 2024-07-10 NOTE — CONSULTS
HF RN consult noted, chart reviewed and following. Attempted to see pt. Multiple staff present. Pt presently being cleaned up. Attempted to find x3 daughters but not present.    Addendum:  Rapid response called as pt projectile vomiting large amounts of brown colored emesis. RR team arrived.

## 2024-07-10 NOTE — PROGRESS NOTES
Progress note    This is 59-year-old male with medical history significant for morbid obesity, PRASHANT, COPD, fatty liver, hypertension, medication noncompliance, tobacco dependency, iron deficiency anemia, chronic lymphedema who was admitted after he was found on the floor and was unable to get up due to severe weakness.  In the ER he had maggot  infestation in the wound in his lower extremities.  This afternoon he had a CODE BLUE when he lost his pulse.  CPR was started with chest compression.  His rhythm was V-fib.  He was shocked twice and did receive 2 doses of epinephrine and 1 dose of amiodarone.  He was also given a dose of 1 g of magnesium.  pH on VBG was 6.8 and was given 1 ampoule of bicarb.  Repeat twelve-lead EKG without acute finding.  His troponin, D-dimer, lactic acid, CBC, electrolytes and urine drug screen are pending.  His imaging are not impressive for fluid overload and will hold the diuretics.  He actually may need some IV fluid.  His serum osmolality is elevated and his urine is concentrated.  His right ventricular was moderately dilated on TTE though his CTA was negative for PE.  ROSC was achieved within 10 minutes, patient was intubated and started on mechanical ventilation.  Will place A-line and a central line.  I will keep a low threshold to start IV antibiotic if leukocytosis persist on repeat CBC.  Discussed with cardiology and are okay with holding diuretics.  Discussed with his daughters who admits that he was feeling depressed, drinking alcohol and not sure if he was using any illicit drugs.  Will check UDS.  Total critical time spent 75 minutes.

## 2024-07-11 VITALS
SYSTOLIC BLOOD PRESSURE: 86 MMHG | BODY MASS INDEX: 44.43 KG/M2 | WEIGHT: 283.07 LBS | HEIGHT: 67 IN | OXYGEN SATURATION: 100 % | DIASTOLIC BLOOD PRESSURE: 35 MMHG | TEMPERATURE: 98.8 F

## 2024-07-11 LAB
ABO + RH BLD: NORMAL
ANISOCYTOSIS BLD QL SMEAR: ABNORMAL
BASOPHILS # BLD: 0 K/UL (ref 0–0.2)
BASOPHILS NFR BLD: 0 %
BLD GP AB SCN SERPL QL: NORMAL
DEPRECATED RDW RBC AUTO: 17.4 % (ref 12.4–15.4)
EOSINOPHIL # BLD: 0 K/UL (ref 0–0.6)
EOSINOPHIL NFR BLD: 0 %
HCT VFR BLD AUTO: 31.1 % (ref 40.5–52.5)
HGB BLD-MCNC: 9.7 G/DL (ref 13.5–17.5)
LACTATE BLD-SCNC: 4.88 MMOL/L (ref 0.4–2)
LYMPHOCYTES # BLD: 1.1 K/UL (ref 1–5.1)
LYMPHOCYTES NFR BLD: 9 %
MACROCYTES BLD QL SMEAR: ABNORMAL
MCH RBC QN AUTO: 29.5 PG (ref 26–34)
MCHC RBC AUTO-ENTMCNC: 31.3 G/DL (ref 31–36)
MCV RBC AUTO: 94.4 FL (ref 80–100)
MONOCYTES # BLD: 0.5 K/UL (ref 0–1.3)
MONOCYTES NFR BLD: 4 %
NEUTROPHILS # BLD: 10.5 K/UL (ref 1.7–7.7)
NEUTROPHILS NFR BLD: 66 %
NEUTS BAND NFR BLD MANUAL: 21 % (ref 0–7)
NRBC BLD-RTO: 2 /100 WBC
PERFORMED ON: ABNORMAL
PLATELET # BLD AUTO: 403 K/UL (ref 135–450)
PMV BLD AUTO: 7.8 FL (ref 5–10.5)
POC SAMPLE TYPE: ABNORMAL
RBC # BLD AUTO: 3.3 M/UL (ref 4.2–5.9)
SLIDE REVIEW: ABNORMAL
WBC # BLD AUTO: 12.1 K/UL (ref 4–11)

## 2024-07-11 PROCEDURE — 2580000003 HC RX 258: Performed by: NURSE PRACTITIONER

## 2024-07-11 PROCEDURE — 2500000003 HC RX 250 WO HCPCS: Performed by: INTERNAL MEDICINE

## 2024-07-11 PROCEDURE — 37799 UNLISTED PX VASCULAR SURGERY: CPT

## 2024-07-11 PROCEDURE — 6360000002 HC RX W HCPCS: Performed by: NURSE PRACTITIONER

## 2024-07-11 PROCEDURE — 0BP1XDZ REMOVAL OF INTRALUMINAL DEVICE FROM TRACHEA, EXTERNAL APPROACH: ICD-10-PCS | Performed by: FAMILY MEDICINE

## 2024-07-11 PROCEDURE — 2580000003 HC RX 258: Performed by: INTERNAL MEDICINE

## 2024-07-11 PROCEDURE — 83605 ASSAY OF LACTIC ACID: CPT

## 2024-07-11 PROCEDURE — 6360000002 HC RX W HCPCS: Performed by: INTERNAL MEDICINE

## 2024-07-11 RX ORDER — LORAZEPAM 2 MG/ML
0.5 INJECTION INTRAMUSCULAR
Status: DISCONTINUED | OUTPATIENT
Start: 2024-07-11 | End: 2024-07-11 | Stop reason: HOSPADM

## 2024-07-11 RX ORDER — MORPHINE SULFATE 2 MG/ML
2 INJECTION, SOLUTION INTRAMUSCULAR; INTRAVENOUS
Status: DISCONTINUED | OUTPATIENT
Start: 2024-07-11 | End: 2024-07-11 | Stop reason: HOSPADM

## 2024-07-11 RX ORDER — MORPHINE SULFATE 4 MG/ML
4 INJECTION, SOLUTION INTRAMUSCULAR; INTRAVENOUS
Status: DISCONTINUED | OUTPATIENT
Start: 2024-07-11 | End: 2024-07-11 | Stop reason: HOSPADM

## 2024-07-11 RX ADMIN — Medication 95 MCG/MIN: at 00:27

## 2024-07-11 RX ADMIN — LORAZEPAM 0.5 MG: 2 INJECTION INTRAMUSCULAR; INTRAVENOUS at 02:20

## 2024-07-11 RX ADMIN — VASOPRESSIN 0.04 UNITS/MIN: 20 INJECTION INTRAVENOUS at 01:31

## 2024-07-11 RX ADMIN — Medication: at 00:29

## 2024-07-11 RX ADMIN — SODIUM BICARBONATE 50 MEQ: 84 INJECTION INTRAVENOUS at 00:58

## 2024-07-11 RX ADMIN — MORPHINE SULFATE 2 MG: 2 INJECTION, SOLUTION INTRAMUSCULAR; INTRAVENOUS at 02:20

## 2024-07-11 ASSESSMENT — PULMONARY FUNCTION TESTS
PIF_VALUE: 34
PIF_VALUE: 33
PIF_VALUE: 34

## 2024-07-11 NOTE — PROGRESS NOTES
Dr. Kemp notified that patient's daughters would like to discuss to code status. Dr. Kemp asked to come to bedside.

## 2024-07-11 NOTE — PROGRESS NOTES
NAME:  Hamlet Mcgrath  YOB: 1965  MEDICAL RECORD NUMBER:  [id number]    Shift Summary: Pt transferred from  after Code Blue. Pt intubated and started on fentanyl and propofol. Taken to CT. Family at bedside    Family updated: Yes:  Daughters Suleman and Shilo at bedside    Rhythm: Normal Sinus Rhythm  and sinus tach    Most recent vitals:   Visit Vitals  BP (!) 91/53   Pulse 99   Temp 98.5 °F (36.9 °C) (Axillary)   Resp 30   Ht 1.702 m (5' 7\")   Wt 128.4 kg (283 lb 1.1 oz)   SpO2 (!) 84%   BMI 44.34 kg/m²      ABP (Arterial line BP): (!) 86/44  ABP Mean (Arterial Line Mean): (!) 57 mmHg    No data found.    No data found.      Respiratory support needed (if any):  - Ventilator Settings:  Vent Days 1       Resp Rate (Set): 18 bpm  FiO2 : 90 %    PEEP/CPAP (cmH2O): 8       Admission weight Weight - Scale: (!) 137.6 kg (303 lb 4.8 oz) (07/08/24 1900)    Today's weight    Wt Readings from Last 1 Encounters:   07/10/24 128.4 kg (283 lb 1.1 oz)        Ashley need assessed each shift: YES -  - continue ashley r/t - fluid volume management of critically ill pt  UOP >30ml/hr: YES  Last documented BM (in last 48 hrs):  Patient Vitals for the past 48 hrs:   Last BM (including prior to admit) Stool Occurrence   07/09/24 0126 07/09/24 1   07/09/24 0907 -- 0   07/10/24 1630 07/10/24 --   07/10/24 1845 07/10/24 1                Restraints (in use currently or dc'd in last 12 hrs): Yes    Order current and documentation up to date? Yes    Lines/Drains reviewed @ bedside.  PICC 07/10/24 Right Brachial (Active)   Number of days: 0       Peripheral IV 07/08/24 Right Forearm (Active)   Number of days: 2       Peripheral IV 07/08/24 Right Cephalic (Active)   Number of days: 2       Peripheral IV 07/08/24 Left Hand (Active)   Number of days: 2       Arterial Line 07/10/24 Right Radial (Active)   Number of days: 0       Urinary Catheter 07/08/24 (Active)   Number of days: 2         Drip rates at handoff:    fentaNYL 75  3,4, Unstageable, DTI, NWPT, and Complex wounds) if present, place Wound referral order by RN under : Yes    New Ostomies, if present place, Ostomy referral order under : No     Nurse 1 eSignature: Electronically signed by Iban hSelley RN on 7/10/24 at 8:29 PM EDT    **SHARE this note so that the co-signing nurse can place an eSignature**    Nurse 2 eSignature: Electronically signed by Leigha Garcia RN on 7/10/24 at 11:29 PM EDT

## 2024-07-11 NOTE — PROGRESS NOTES
4 Eyes Skin Assessment     NAME:  Hamlet Mcgrath  YOB: 1965  MEDICAL RECORD NUMBER:  [id number]    The patient is being assessed for  Transfer to New Unit    I agree that at least one RN has performed a thorough Head to Toe Skin Assessment on the patient. ALL assessment sites listed below have been assessed.      Areas assessed by both nurses:    Head, Face, Ears, Shoulders, Back, Chest, Arms, Elbows, Hands, Sacrum. Buttock, Coccyx, Ischium, Legs. Feet and Heels, and Under Medical Devices         Does the Patient have a Wound? Yes wound(s) were present on assessment. LDA wound assessment was Initiated and completed by RN       Hansel Prevention initiated by RN: Yes  Wound Care Orders initiated by RN: Yes    Pressure Injury (Stage 3,4, Unstageable, DTI, NWPT, and Complex wounds) if present, place Wound referral order by RN under : Yes    New Ostomies, if present place, Ostomy referral order under : No     Nurse 1 eSignature: Electronically signed by Brittany Singh RN on 7/10/24 at 8:19 PM EDT    **SHARE this note so that the co-signing nurse can place an eSignature**    Nurse 2 eSignature: Electronically signed by Iban Shelley RN on 7/10/24 at 8:25 PM EDT

## 2024-07-11 NOTE — PROGRESS NOTES
Patient with acute kidney injury, sepsis, lactic, acidosis sepsis bowel ischemia status post code blue on three pressers  asked to give him a amp of HCO3   increase sodium bicarb 200 an hour   As per nurse  family Not certain about aggressive   Asked to discuss with Icu team if want to be  aggressively will start him on CRT tonight   Gave Nurse phone number [phone] to update me and then I will put the orders.   Will nee d dialysis line   thanks.

## 2024-07-11 NOTE — PROGRESS NOTES
General Surgery    Discussed with the Hospitalist    CT reviewed   Diffuse bowel dilation with mesenteric and portal gas    Given the events of the day I suspect this is secondary to global hypoperfusion rather than primary bowel ischemia    Lactate has improved    Continue aggressive resuscitation    Repeat CT at some point if he does not improve    Full consult to follow    Electronically signed by CHAZ REYNOLDS MD on 7/10/2024 at 8:29 PM

## 2024-07-11 NOTE — PROGRESS NOTES
Asystole noted on monitor. No heart tones, respirations, or pulse detected. Time of death noted at 0243. Dr. Kemp notified.

## 2024-07-11 NOTE — PROGRESS NOTES
Pt came to ICU around 1620 after a cardiac arrest on 4N. Pelham was immediately placed upon arrival. BP's difficult to obtain and Radha pressure not matching cuff pressures. Originally spoke to Dr. Jose Antonio Coreas via phone to give update on pt's status at 1630. Levo, prop, and fentanyl ordered at that time. Spoke to Dr. Coreas via phone again at 1658 to give update on fluctuating BP's. Per Dr. Coreas, keep Levo at current rate, get pt adequately sedated and then try to get a better reading. Pt was taken to CT around 1750 was at a RASS of -2. Afterwards, radha pressure and cuff pressure correlated better. Alexa Christiansen NP contacted via [employer] at  [phone] to ask if vaso could be added. See new orders. Family came to bedside and updated on pt's POC.

## 2024-07-11 NOTE — PROGRESS NOTES
Spoke with Dr. Vallecillo with nephrology. New orders received to give an amp of bicarb and to increase bicarb gtt to 200 mL/hr.

## 2024-07-11 NOTE — PROGRESS NOTES
Dr. Kemp notified of patient's low urine output. Pt has urinated 95 mL since beginning of shift. New orders received to consult Nephrology.

## 2024-07-11 NOTE — CONSULTS
Clinical Pharmacy Note  Vancomycin Consult    Hamlet Mcgrath is a 59 y.o. male ordered Vancomycin for SSTI/intra abdominal infection; consult received from Dr. Kemp to manage therapy. Also receiving Zosyn.    Allergies:  Patient has no known allergies.     Temp max:  Temp (24hrs), Av.9 °F (36.6 °C), Min:97.3 °F (36.3 °C), Max:98.5 °F (36.9 °C)      Recent Labs     24  0505 07/10/24  0540 07/10/24  1602   WBC 12.3* 13.2* 16.8*       Recent Labs     24  0505 07/10/24  0540 07/10/24  1602   BUN 38* 39* 45*   CREATININE 0.6* 0.9 2.0*         Intake/Output Summary (Last 24 hours) at 7/10/2024 2118  Last data filed at 7/10/2024 1955  Gross per 24 hour   Intake 145.65 ml   Output 2445 ml   Net -2299.35 ml       Culture Results:      Ht Readings from Last 1 Encounters:   24 1.702 m (5' 7\")        Wt Readings from Last 1 Encounters:   07/10/24 128.4 kg (283 lb 1.1 oz)         Estimated Creatinine Clearance: 52 mL/min (A) (based on SCr of 2 mg/dL (H)).    Assessment/Plan:  Day # 1 of vancomycin.  Most recent creatinine of 2  Vancomycin 2000 mg IV x 1 dose ordered.    Goal -600  Random vancomycin level ordered for the morning    Thank you for the consult.   Alonzo Napoles, Indigo[employer]

## 2024-07-11 NOTE — PROGRESS NOTES
The endotracheal tube terminates approximately 1.9 cm above the lorelei. ETT withdrawn from 27 cm to 26 cm from the lip per critical care

## 2024-07-11 NOTE — CONSULTS
PATIENT NAME: Hamlet Mcgrath   YOB: 1965    ADMISSION DATE: 7/8/2024  6:29 PM      TODAY'S DATE: 7/10/2024    CHIEF COMPLAINT:  weakness      HISTORY OF PRESENT ILLNESS:  The patient is a 59 y.o. male  who presented from home on 7/8 after being found on the ground by his family. Was conscious but very weak. Several comorbid issues including CHF. Admitted with ongoing workup with CT chest which was negative for PE. Right heart dilated on ECHO. Today had significant episode of emesis with subsequent cardiac arrest and code blue. Successfully resuscitated. Now intubate in ICU on three pressors with NG in place. Non contrast CT done following code blue with diffuse dilation of fluid filled small and large bowel. Diffuse small bowel pneumatosis with mesenteric venous gas. No free air or other acute pathology.    CT scan imaging was independently reviewed by me today      Past Medical History:        Diagnosis Date    COPD (chronic obstructive pulmonary disease) (HCC)        Past Surgical History:        Procedure Laterality Date    EYE SURGERY Left 1968    weak eye muscle       Medications Prior to Admission:   Medications Prior to Admission: [DISCONTINUED] Emollient (DERMAPHOR) OINT ointment, Apply topically 3 times daily as needed (skin irritation)  [DISCONTINUED] lisinopril (PRINIVIL;ZESTRIL) 20 MG tablet, Take 1 tablet by mouth daily  [DISCONTINUED] amLODIPine (NORVASC) 5 MG tablet, Take 1 tablet by mouth daily  [DISCONTINUED] furosemide (LASIX) 40 MG tablet, Take 1 tablet by mouth 2 times daily  [DISCONTINUED] sildenafil (VIAGRA) 50 MG tablet, Take 1 tablet by mouth as needed for Erectile Dysfunction  [DISCONTINUED] hydrocortisone 2.5 % cream, Apply topically 2 times daily.    Allergies:  Patient has no known allergies.    Social History:   TOBACCO:   reports that he has been smoking cigarettes. He uses smokeless tobacco.  ETOH:   reports current alcohol use of about 12.0 standard drinks of alcohol

## 2024-07-11 NOTE — PROGRESS NOTES
2328: Dr. Kemp notified of ABG results and noted critical pH of 7.064. Awaiting response at this time.     2335: New orders received for sodium bicarb gtt.

## 2024-07-11 NOTE — PROGRESS NOTES
Narcotic Waste Documentation    Administered 700 mcg of Fentanyl and wasted 300 mcg per [facility] policy.     Electronically signed by Leigha Garcia RN on 7/11/2024 at 4:13 AM   Electronically signed by Stefany Hammond RN on 7/11/2024 at 4:25 AM

## 2024-07-11 NOTE — PROGRESS NOTES
Latest Reference Range & Units 07/10/24 23:19   pH, Arterial 7.350 - 7.450  7.064 (LL)   pCO2, Arterial 35.0 - 45.0 mmHg 51.1 (H)   pO2, Arterial 75.0 - 108.0 mmHg 217.0 (H)   HCO3, Arterial 21.0 - 29.0 mmol/L 14.6 (L)     Insp pressure increased to 22 per critical care

## 2024-07-11 NOTE — PROGRESS NOTES
I was asked to come to bedside as patient's daughters wanted to discuss CODE STATUS.  They did not want patient to be resuscitated (no CPR), no escalation of care.  No going up on pressors.  No CRRT.  When discussed about terminal extubation-they were not sure.  They wanted the patient to go peacefully and without feeling any pain and without knowing that he is dying.  Discussed options about terminally extubating him, giving morphine, Ativan or leaving him on the vent and monitor for now.  They were unable to decide and wanted to discuss with family.

## 2024-07-11 NOTE — PROGRESS NOTES
V2.0  [facility] [place]      Name:  Hamlet Mcgrath /Age/Sex: 1965  (59 y.o. male)   MRN & CSN:  [id number] & [id number] Encounter Date/Time: 7/10/2024 11:42 AM EDT    Location:  [id number] PCP: No primary care provider on file.       Hospital Day: 3    Assessment and Plan:   Hamlet Mcgrath is a 59 y.o. male with pmh of COPD HF lymphedema who presents with Acute on chronic systolic CHF, NYHA class 2 and ACC/AHA stage C (HCC)    Subjective :  Patient was examined at bedside in morning with his daughter. He was complaining of increasing back pain for which he was on Dilaudid and oxycodone was added to his medications. Denied any active chest pain, shortness of breath and reported multiple times that he does not feel short of breath or has any active chest pain. CT chest for PE was done which was negative yesterday  It was found out from the daughter that the patient had a suciide note present at his home and was also found to have a gun in his room. Suicide precautions and sitter was ordered at bedside    Plan:  Acute decompensated heart failure: last echocardiogram in 2017 with normal EF, pt with poor hygiene not taking any medications for unknown duration, elevated proBNP check echocardiogram, cardiology consult on coreg lisnipril for now, IV lasix switched to oral  Severe lymphedema: with b/l LE wounds, consult podiatry wound care and PT/OT  Mild rhabdomyolysis: improved in AM patient was found on floor for unknown duration  Metabolic acidosis: likely 2/2 diarrhea, improving in AM continue to monitor  Physical deconditioning: consult pT/OT and   Suicidal ideation : Psychiatry consulted, sitter ordered and suicide precautions initiated    Diet ADULT DIET; Regular; No Added Salt (3-4 gm)   DVT Prophylaxis [] Lovenox, []  Heparin, [] SCDs, [] Ambulation,  [] Eliquis, [] Xarelto  [] Coumadin   Code Status Full Code       Surrogate Decision Maker/ POA      Subjective:

## 2024-07-11 NOTE — PROGRESS NOTES
This RN and RT at bedside to terminally extubate patient. Comfort medications given, see MAR. Pt terminally extubated. Family at bedside.

## 2024-07-11 NOTE — PROGRESS NOTES
Family discussing possible withdrawal of care while at bedside, [facility] referral made due to discussion of withdrawal of care.      Jerri Vernon RN

## 2024-07-11 NOTE — PROGRESS NOTES
Family has decided to terminally extubate the patient.  They are waiting for one of the family members to arrive.  Orders placed.

## 2024-07-11 NOTE — DEATH NOTES
Death Pronouncement Note  Patient's Name: Hamlet Mcgrath   Patient's YOB: 1965  MRN Number: [id number]    Admitting Provider: Aldo Benson MD  Attending Provider: Marimar Castle MD    Patient was examined and the following were absent:   No heart tones auscultated, no rise and fall of chest noted, pupils were fixed and dilated.  I declared the patient dead on 7/11/2024 at 2:43 AM    Electronically signed by Zeus Kemp MD on 7/11/24 at 3:06 AM EDT

## 2024-07-11 NOTE — SIGNIFICANT EVENT
I received PS message from [facility].  Discussed with radiologist -patient had diffuse small bowel obstruction, dilation of the colon, pneumatosis intestinalis, gas within the mesentery/mesenteric vein.  Patient abdomen seems to be soft to palpate.  Stat consult to general surgery-Dr. Howard  Discussed with Dr. Howard over phone-stated that it could be because of hypoperfusion and recommended fluid resuscitation.  Also lactic acid trending down.    Ordered 1 L NS bolus, maintenance fluids.  Also broadened antibiotics to vancomycin, Zosyn  Repeat labs-CBC, CMP, lactic acid, ABG ordered for 11 PM  Patient also noted to have watery diarrhea, dark in color  Will check for C. difficile, stool occult.      Chest x-ray-reported endotracheal tube terminating approximately 8.1 cm above the lorelei.  ET tube was advanced by RT and repeat chest x-ray ordered.    Patient was on 2 pressors, getting towards max doses.  Phenylephrine added.  Discussed with patient's daughters at bedside patient's critical condition, CT results, general surgery consult, pressor requirement going up.    Latest ABG-pH 7.064, pCO2 51, pO2 217, HCO3 14.6.  Sodium bicarb drip ordered.

## 2024-07-11 NOTE — PROGRESS NOTES
1. Endotracheal tube terminates approximately 8.1 cm above the lorelei. ETT advanced from 23 to 26. Xray pending. MD yoder

## 2024-07-11 NOTE — DISCHARGE SUMMARY
control, iterative reconstruction, and/or weight based adjustment of the mA/kV was utilized to reduce the radiation dose to as low as reasonably achievable. COMPARISON: None. HISTORY: ORDERING SYSTEM PROVIDED HISTORY: RV dilation, immobile, rule out PE TECHNOLOGIST PROVIDED HISTORY: Reason for exam:->RV dilation, immobile, rule out PE Additional Contrast?->1 Reason for Exam: RV dilation, immobile, rule out PE FINDINGS: Pulmonary Arteries: Pulmonary arteries are adequately opacified for evaluation.  No evidence of intraluminal filling defect to suggest pulmonary embolism.  Main pulmonary artery is normal in caliber. Mediastinum: No evidence of mediastinal lymphadenopathy.  The heart and pericardium demonstrate no acute abnormality.  There is no acute abnormality of the thoracic aorta.  Patulous, fluid-filled esophagus. Lungs/pleura: The lungs are without acute process.  No focal consolidation or pulmonary edema.  No evidence of pleural effusion or pneumothorax. Upper Abdomen: Distended stomach. Soft Tissues/Bones: No acute bone or soft tissue abnormality.     1. No evidence of pulmonary embolism or acute pulmonary abnormality. 2. Patulous, fluid-filled esophagus and distended stomach of uncertain etiology.     Echo (TTE) complete (PRN contrast/bubble/strain/3D)    Result Date: 7/9/2024    Left Ventricle: Hyperdynamic left ventricular systolic function with a visually estimated EF of 65 - 70%. Left ventricle size is normal. Normal wall thickness. Normal wall motion. Normal diastolic function.   Right Ventricle: Right ventricle is moderately dilated. Normal systolic function. TAPSE is 2.9 cm.   Interatrial Septum: Grade I Positive (1 to 9 bubbles). Agitated saline study was positive without provocation. Right to left shunt was noted.   Image quality is fair.     XR CHEST PORTABLE    Result Date: 7/8/2024  EXAMINATION: ONE XRAY VIEW OF THE CHEST 7/8/2024 7:04 pm COMPARISON: CXR dated 08/17/2016 HISTORY: ORDERING SYSTEM  PROVIDED HISTORY: chest pain TECHNOLOGIST PROVIDED HISTORY: Reason for exam:->chest pain Reason for Exam: chest pain FINDINGS: Medical devices: None. Mediastinum/Heart: The mediastinal contours are normal. The heart appears normal in size. Lungs: The lungs are clear. Pleura: No pleural effusion. No pneumothorax.     No radiographic evidence of acute cardiopulmonary pathology.       CBC:   Recent Labs     07/10/24  0540 07/10/24  1557 07/10/24  1602 07/10/24  1624 07/10/24  2319   WBC 13.2*  --  16.8*  --  12.1*   HGB 9.8*   < > 9.5* 12.0* 9.7*     --  427  --  403    < > = values in this interval not displayed.     BMP:    Recent Labs     07/10/24  0540 07/10/24  1602 07/10/24  2319    141 137   K 3.8 4.7 5.1    107 105   CO2 17* 17* 14*   BUN 39* 45* 48*   CREATININE 0.9 2.0* 2.5*   GLUCOSE 148* 168* 93     Hepatic:   Recent Labs     07/08/24  1835 07/10/24  2319   AST 34 46*   ALT 30 29   BILITOT 0.6 1.0   ALKPHOS 91 84     Lipids:   Lab Results   Component Value Date/Time    CHOL 88 07/09/2024 05:05 AM    HDL 39 07/09/2024 05:05 AM    TRIG 83 07/09/2024 05:05 AM     Hemoglobin A1C:   Lab Results   Component Value Date/Time    LABA1C 5.6 11/07/2018 10:23 AM     TSH:   Lab Results   Component Value Date/Time    TSH 1.40 03/07/2017 03:16 PM     Troponin: No results found for: \"TROPONINT\"  Lactic Acid:   Recent Labs     07/10/24  1851 07/10/24  2114 07/10/24  2318   LACTA 3.4* 3.1* 4.1*     BNP:   Recent Labs     07/08/24  1835   PROBNP 1,314*     UA:  Lab Results   Component Value Date/Time    NITRU Negative 07/08/2024 07:08 PM    COLORU Yellow 07/08/2024 07:08 PM    PHUR 6.5 07/08/2024 07:08 PM    PHUR 6.5 03/07/2017 03:16 PM    PHUR 6.5 03/07/2017 03:16 PM    WBCUA 2 07/08/2024 07:08 PM    RBCUA 3 07/08/2024 07:08 PM    BACTERIA None Seen 07/08/2024 07:08 PM    CLARITYU Clear 07/08/2024 07:08 PM    LEUKOCYTESUR Negative 07/08/2024 07:08 PM    UROBILINOGEN 1.0 07/08/2024 07:08 PM    BILIRUBINUR  Negative 07/08/2024 07:08 PM    BLOODU Negative 07/08/2024 07:08 PM    GLUCOSEU Negative 07/08/2024 07:08 PM    KETUA TRACE 07/08/2024 07:08 PM     Urine Cultures: No results found for: \"LABURIN\"  Blood Cultures:   Lab Results   Component Value Date/Time    BC No growth after 5 days of incubation. 07/25/2018 02:50 PM     No results found for: \"BLOODCULT2\"  Organism:   Lab Results   Component Value Date/Time    ORG Pseudomonas aeruginosa 08/10/2018 12:57 PM    ORG Enterococcus species 08/10/2018 12:57 PM     Electronically signed by Zeus Kemp MD on 7/11/2024 at 4:13 AM

## 2024-07-11 NOTE — PROGRESS NOTES
Latest Reference Range & Units 07/10/24 20:41   pH, Arterial 7.350 - 7.450  7.070 (LL)   pCO2, Arterial 35.0 - 45.0 mmHg 61.0 (H)   pO2, Arterial 75.0 - 108.0 mmHg 290.0 (H)   HCO3, Arterial 21.0 - 29.0 mmol/L 17.6 (L)       Increase the set rate to 24 per critical care

## 2024-07-12 LAB
EKG ATRIAL RATE: 119 BPM
EKG DIAGNOSIS: NORMAL
EKG P AXIS: 67 DEGREES
EKG P-R INTERVAL: 140 MS
EKG Q-T INTERVAL: 314 MS
EKG QRS DURATION: 98 MS
EKG QTC CALCULATION (BAZETT): 441 MS
EKG R AXIS: 72 DEGREES
EKG T AXIS: 28 DEGREES
EKG VENTRICULAR RATE: 119 BPM

## 2024-07-12 PROCEDURE — 93010 ELECTROCARDIOGRAM REPORT: CPT | Performed by: INTERNAL MEDICINE

## 2024-08-01 NOTE — PROGRESS NOTES
Physician Progress Note      PATIENT:               XOCHILT TORRES  [facility] #:                  [id number]  :                       1965  ADMIT DATE:       2024 6:29 PM  DISCH DATE:        2024 2:43 AM  RESPONDING  PROVIDER #:        Zeus Kemp MD          QUERY TEXT:    Patient admitted with acute exacerbation of congestive heart failure.  Noted   documentation of sepsis in Nephrology PN on . If possible, please document   in the progress notes and discharge summary if Sepsis was:    The medical record reflects the following:    Risk Factors:CANDE, CHF    Clinical Indicators:Nephrology PN on -Patient with acute kidney injury,   sepsis, lactic, acidosis sepsis bowel ischemia status post code blue on three   pressers    HR-114,115, 106,108 (),110 (), 142,145, 106,102(7/10)    RR-34,31,33,35,29,26    BP-69/41, 66/54,61/34    WBC-12.3(), 13.2,16.8,12.1(7/10)    Lactic acid-6.0,3.4,3.1,4.1 on 7/10    Treatment:IV piperacillin-tazobactam (ZOSYN) 4,500 mg, IV vancomycin   (VANCOCIN) 2000 mg, IVF    Thank You,  GINNA Shaver, CDS  Options provided:  -- Sepsis secondary to ischemic bowel confirmed.  -- Sepsis ruled out after study  -- Other - I will add my own diagnosis  -- Disagree - Not applicable / Not valid  -- Disagree - Clinically unable to determine / Unknown  -- Refer to Clinical Documentation Reviewer    PROVIDER RESPONSE TEXT:    This patient has Sepsis secondary to ischemic bowel confirmed.    Query created by: Melany Yeager on 2024 1:41 AM      QUERY TEXT:    Patient admitted with acute exacerbation of congestive heart failure.  Noted   documentation of ischemic bowel in General surgery CN on 7/10/2024.  Please   document in progress notes and discharge summary if you are evaluating and/or   treating any of the following:  The medical record reflects the following:  Risk Factors: CANDE, CHF, Sepsis  Clinical Indicators:  General surgery CN on pain 7/10/2024-Mesenteric

## 2024-09-02 NOTE — PROGRESS NOTES
[facility]   Progress Note and Procedure Note      Valeria Robles  MEDICAL RECORD NUMBER:  [id number]  AGE: 47 y.o. GENDER: male  : 1965  EPISODE DATE:  8/10/2018    Subjective:     Chief Complaint   Patient presents with    Wound Check     Initial visit for Right lower leg wound         HISTORY of PRESENT ILLNESS HPI     Shirin Huerta is a 47 y.o. male who presents today for wound/ulcer evaluation. History of Wound Context: venous. The patient presented to the ED on 18 for RLE cellulitis. At that time, hospitalization was recommended, but the patient refused. He was treated with IV clindamycin in the ED and sent home with PO clindamycin.      Wound/Ulcer Pain Timing/Severity: none  Quality of pain: N/A  Severity:  0 / 10   Modifying Factors: Pain worsens with local pressure and an increase in the amount of swelling in his LE  Associated Signs/Symptoms: edema, drainage and odor    Ulcer Identification:  Ulcer Type: venous  Contributing Factors: edema, venous stasis, decreased mobility and obesity    Wound: N/A        PAST MEDICAL HISTORY        Diagnosis Date    COPD (chronic obstructive pulmonary disease) ([address]        PAST SURGICAL HISTORY    Past Surgical History:   Procedure Laterality Date    EYE SURGERY Left     weak eye muscle       FAMILY HISTORY    Family History   Problem Relation Age of Onset    Other Mother         liver from drinking    Heart Disease Father        SOCIAL HISTORY    Social History   Substance Use Topics    Smoking status: Current Every Day Smoker     Packs/day: 1.00     Types: Cigarettes    Smokeless tobacco: Current User    Alcohol use 7.2 oz/week     12 Cans of beer per week      Comment: quit liquor about 6 months ago       ALLERGIES    No Known Allergies    MEDICATIONS    Current Outpatient Prescriptions on File Prior to Encounter   Medication Sig Dispense Refill    furosemide (LASIX) 40 MG tablet Take 1 tablet by mouth 2 times Patient's feet placed into basin of cold water     Casey Pritchett RN  09/02/24 [id number]     daily 60 tablet 1    sulfamethoxazole-trimethoprim (BACTRIM DS) 800-160 MG per tablet Take 1 tablet by mouth 2 times daily for 7 days 14 tablet 0    nicotine (NICODERM CQ) 21 MG/24HR Place 1 patch onto the skin every 24 hours 30 patch 3    ergocalciferol (DRISDOL) 93561 UNITS capsule Take 1 capsule by mouth once a week 4 capsule 3     No current facility-administered medications on file prior to encounter. REVIEW OF SYSTEMS    Pertinent items are noted in HPI.     Objective:      BP (!) 150/91   Pulse 105   Temp 98.3 °F (36.8 °C) (Oral)   Resp 16   Ht 5' 9\" (1.753 m)   Wt (!) 321 lb 3.4 oz (145.7 kg)   BMI 47.43 kg/m²     Wt Readings from Last 3 Encounters:   08/10/18 (!) 321 lb 3.4 oz (145.7 kg)   08/08/18 (!) 314 lb (142.4 kg)   07/25/18 (!) 317 lb 7.4 oz (144 kg)       PHYSICAL EXAM    General Appearance: alert and oriented to person, place and time, well developed and well- nourished, in no acute distress  Skin: warm and dry; right lower leg with venous ulcer with a large amount of thick yellow slough  Head: normocephalic and atraumatic  Eyes: pupils equal, round, and reactive to light, extraocular eye movements intact, conjunctivae normal  Neck: supple and non-tender without mass  Pulmonary/Chest: clear to auscultation bilaterally- no wheezes, rales or rhonchi, normal air movement, no respiratory distress  Cardiovascular: normal rate, regular rhythm, normal S1 and S2, no murmurs, rubs, clicks, or gallops, distal pulses intact per doppler (patient's foot is too swollen to find palpable pulses today)  Extremities: no cyanosis or clubbing; +3 pitting edema; patient has lymphedema in bilateral LE's  Musculoskeletal: normal range of motion, no joint swelling, deformity or tenderness  Neurologic: reflexes normal and symmetric, no cranial nerve deficit, gait, coordination and speech normal      Assessment:      Patient Active Problem List   Diagnosis Code    Bilateral lower extremity edema R60.0    Number of days: 0       Percent of Wound(s)/Ulcer(s) Debrided: 100%    Total Surface Area Debrided:  214.09 sq cm       Diabetic/Pressure/Non Pressure Ulcers only:  Ulcer: Non-Pressure ulcer, limited to breakdown of skin      Estimated Blood Loss:  Minimal    Hemostasis Achieved:  by pressure    Procedural Pain:  8  / 10     Post Procedural Pain:  3 / 10     Response to treatment:  With complaints of pain. PATIENT EDUCATION focused on this week's plan of care for his wound. He was instructed to apply Santyl to the wound bed with a Q-tip (nickel thick). This is to be followed by gauze moistened with normal saline and covered with dry gauze, kerlix and an ACE wrap. PATIENT EDUCATION also focused on proper application of ACE wrap. Patient instructed to start at the ankle, go down to just above his toes and go up leg to just below knee. Avoid Velcro touching the skin. Patient verbalized understanding. Plan:     Treatment Note please see attached Discharge Instructions    Written patient dismissal instructions given to patient and signed by patient or POA. Discharge Instructions       Wound Clinic Physician Orders and Discharge Instructions  [place] [address], [place] [address]  Telephone: [phone]    NAME:  Clovis Alvarez OF BIRTH:  1965  MEDICAL RECORD NUMBER:  [id number]  DATE:  8/10/2018    Wound Cleansing:   Do not scrub or use excessive force. Cleanse wound prior to applying a clean dressing with:  [] Normal Saline [x] Keep Wound Dry in Shower    [] Wound Cleanser   [] Cleanse wound with Mild Soap & Water  [] May Shower at Discharge   [] Other:       Topical Treatments:  Do not apply lotions, creams, or ointments to wound bed unless directed.    [] Apply moisturizing lotion to skin surrounding the wound prior to dressing change.  [] Apply antifungal ointment to skin surrounding the wound prior to dressing change.  [] Apply thin film of moisture barrier ointment to skin immediately around wound. [] Other:       Dressings:           Wound Location: RIGHT LOWER LEG WOUNDS   [x] Apply Primary Dressing:           [x] Santyl with Moisten saline gauze       [x] Cover and Secure with:     [x] Gauze                [x] Kerlix   [x] Ace Wrap    Avoid contact of tape with skin. [x] Change dressing: [x] Daily       Edema Control: ACE WRAPS TO BILATERAL LOWER LEGS  Apply: [] Compression Stocking []Right Leg []Left Leg   [] Tubigrip []Right Leg Double Layer []Left Leg Double Layer       []Right Leg Single Layer []Left Leg Single Layer   [] SpandaGrip []Right Leg  []Left Leg      []Low compression 5-10 mm/Hg      []Medium compression 10-20 mm/Hg     []High compression  20-30 mm/Hg   every morning immediately when getting up should be applied to affected leg(s) from mid foot to knee making sure to cover the heel. Remove every night before going to bed. [] Elevate leg(s) above the level of the heart when sitting. [] Avoid prolonged standing in one place. [] Elevate arm/hand above the level of the heart []RightArm []LeftArm     Compression:  Apply: [] Multilayer Compression Wrap Applied in Clinic []RightLeg []Left Leg   [] Multi-layer compression. Do not get leg(s) with wrap wet. If wraps become too tight call the center or completely remove the wrap. [] Elevate leg(s) above the level of the heart when sitting. [] Avoid prolonged standing in one place.     Dietary:  [x] Diet as tolerated: [] Calorie Diabetic Diet: [] No Added Salt:  [] Increase Protein: [] Other:     Activity:  [x] Activity as tolerated:  [] Patient has no activity restrictions     [] Strict Bedrest: [] Remain off Work:     [] May return to full duty work:                [] Return to work with restrictions:     Return Appointment:  [] Wound and dressing supply provider:   [x] ECF or Home Healthcare: [place]